# Patient Record
Sex: FEMALE | Race: OTHER | URBAN - METROPOLITAN AREA
[De-identification: names, ages, dates, MRNs, and addresses within clinical notes are randomized per-mention and may not be internally consistent; named-entity substitution may affect disease eponyms.]

---

## 2017-06-23 ENCOUNTER — EMERGENCY (EMERGENCY)
Facility: HOSPITAL | Age: 22
LOS: 0 days | Discharge: HOME | End: 2017-06-23
Attending: EMERGENCY MEDICINE | Admitting: EMERGENCY MEDICINE
Payer: MEDICAID

## 2017-06-23 VITALS
SYSTOLIC BLOOD PRESSURE: 119 MMHG | RESPIRATION RATE: 18 BRPM | TEMPERATURE: 98 F | DIASTOLIC BLOOD PRESSURE: 67 MMHG | OXYGEN SATURATION: 100 %

## 2017-06-23 VITALS
HEART RATE: 73 BPM | WEIGHT: 173.94 LBS | OXYGEN SATURATION: 98 % | DIASTOLIC BLOOD PRESSURE: 65 MMHG | SYSTOLIC BLOOD PRESSURE: 110 MMHG | RESPIRATION RATE: 16 BRPM | TEMPERATURE: 99 F

## 2017-06-23 DIAGNOSIS — R10.9 UNSPECIFIED ABDOMINAL PAIN: ICD-10-CM

## 2017-06-23 LAB
ALBUMIN SERPL ELPH-MCNC: 4.1 G/DL — SIGNIFICANT CHANGE UP (ref 3.3–5)
ALP SERPL-CCNC: 95 U/L — SIGNIFICANT CHANGE UP (ref 40–120)
ALT FLD-CCNC: 14 U/L — SIGNIFICANT CHANGE UP (ref 12–78)
ANION GAP SERPL CALC-SCNC: 5 MMOL/L — SIGNIFICANT CHANGE UP (ref 5–17)
APPEARANCE UR: CLEAR — SIGNIFICANT CHANGE UP
AST SERPL-CCNC: 15 U/L — SIGNIFICANT CHANGE UP (ref 15–37)
BACTERIA # UR AUTO: (no result)
BASOPHILS # BLD AUTO: 0 K/UL — SIGNIFICANT CHANGE UP (ref 0–0.2)
BASOPHILS NFR BLD AUTO: 0.7 % — SIGNIFICANT CHANGE UP (ref 0–2)
BILIRUB SERPL-MCNC: 0.3 MG/DL — SIGNIFICANT CHANGE UP (ref 0.2–1.2)
BILIRUB UR-MCNC: NEGATIVE — SIGNIFICANT CHANGE UP
BUN SERPL-MCNC: 10 MG/DL — SIGNIFICANT CHANGE UP (ref 7–23)
CALCIUM SERPL-MCNC: 8.9 MG/DL — SIGNIFICANT CHANGE UP (ref 8.5–10.1)
CHLORIDE SERPL-SCNC: 110 MMOL/L — HIGH (ref 96–108)
CO2 SERPL-SCNC: 24 MMOL/L — SIGNIFICANT CHANGE UP (ref 22–31)
COLOR SPEC: YELLOW — SIGNIFICANT CHANGE UP
CREAT SERPL-MCNC: 0.63 MG/DL — SIGNIFICANT CHANGE UP (ref 0.5–1.3)
DIFF PNL FLD: (no result)
EOSINOPHIL # BLD AUTO: 0.1 K/UL — SIGNIFICANT CHANGE UP (ref 0–0.5)
EOSINOPHIL NFR BLD AUTO: 1.2 % — SIGNIFICANT CHANGE UP (ref 0–6)
EPI CELLS # UR: SIGNIFICANT CHANGE UP
GLUCOSE SERPL-MCNC: 77 MG/DL — SIGNIFICANT CHANGE UP (ref 70–99)
GLUCOSE UR QL: NEGATIVE MG/DL — SIGNIFICANT CHANGE UP
HCT VFR BLD CALC: 39.6 % — SIGNIFICANT CHANGE UP (ref 34.5–45)
HGB BLD-MCNC: 13.2 G/DL — SIGNIFICANT CHANGE UP (ref 11.5–15.5)
KETONES UR-MCNC: NEGATIVE — SIGNIFICANT CHANGE UP
LEUKOCYTE ESTERASE UR-ACNC: NEGATIVE — SIGNIFICANT CHANGE UP
LYMPHOCYTES # BLD AUTO: 2 K/UL — SIGNIFICANT CHANGE UP (ref 1–3.3)
LYMPHOCYTES # BLD AUTO: 33 % — SIGNIFICANT CHANGE UP (ref 13–44)
MCHC RBC-ENTMCNC: 28.8 PG — SIGNIFICANT CHANGE UP (ref 27–34)
MCHC RBC-ENTMCNC: 33.2 GM/DL — SIGNIFICANT CHANGE UP (ref 32–36)
MCV RBC AUTO: 86.6 FL — SIGNIFICANT CHANGE UP (ref 80–100)
MONOCYTES # BLD AUTO: 0.7 K/UL — SIGNIFICANT CHANGE UP (ref 0–0.9)
MONOCYTES NFR BLD AUTO: 12.1 % — SIGNIFICANT CHANGE UP (ref 2–14)
NEUTROPHILS # BLD AUTO: 3.3 K/UL — SIGNIFICANT CHANGE UP (ref 1.8–7.4)
NEUTROPHILS NFR BLD AUTO: 53 % — SIGNIFICANT CHANGE UP (ref 43–77)
NITRITE UR-MCNC: NEGATIVE — SIGNIFICANT CHANGE UP
PH UR: 6 — SIGNIFICANT CHANGE UP (ref 5–8)
PLATELET # BLD AUTO: 238 K/UL — SIGNIFICANT CHANGE UP (ref 150–400)
POTASSIUM SERPL-MCNC: 4.1 MMOL/L — SIGNIFICANT CHANGE UP (ref 3.5–5.3)
POTASSIUM SERPL-SCNC: 4.1 MMOL/L — SIGNIFICANT CHANGE UP (ref 3.5–5.3)
PROT SERPL-MCNC: 7.6 GM/DL — SIGNIFICANT CHANGE UP (ref 6–8.3)
PROT UR-MCNC: NEGATIVE MG/DL — SIGNIFICANT CHANGE UP
RBC # BLD: 4.57 M/UL — SIGNIFICANT CHANGE UP (ref 3.8–5.2)
RBC # FLD: 12.2 % — SIGNIFICANT CHANGE UP (ref 10.3–14.5)
RBC CASTS # UR COMP ASSIST: (no result) /HPF (ref 0–4)
SODIUM SERPL-SCNC: 139 MMOL/L — SIGNIFICANT CHANGE UP (ref 135–145)
SP GR SPEC: 1.01 — SIGNIFICANT CHANGE UP (ref 1.01–1.02)
UROBILINOGEN FLD QL: NEGATIVE MG/DL — SIGNIFICANT CHANGE UP
WBC # BLD: 6.1 K/UL — SIGNIFICANT CHANGE UP (ref 3.8–10.5)
WBC # FLD AUTO: 6.1 K/UL — SIGNIFICANT CHANGE UP (ref 3.8–10.5)
WBC UR QL: SIGNIFICANT CHANGE UP

## 2017-06-23 PROCEDURE — 76830 TRANSVAGINAL US NON-OB: CPT | Mod: 26

## 2017-06-23 PROCEDURE — 99285 EMERGENCY DEPT VISIT HI MDM: CPT

## 2017-06-23 RX ORDER — SODIUM CHLORIDE 9 MG/ML
3 INJECTION INTRAMUSCULAR; INTRAVENOUS; SUBCUTANEOUS ONCE
Qty: 0 | Refills: 0 | Status: COMPLETED | OUTPATIENT
Start: 2017-06-23 | End: 2017-06-23

## 2017-06-23 RX ADMIN — SODIUM CHLORIDE 3 MILLILITER(S): 9 INJECTION INTRAMUSCULAR; INTRAVENOUS; SUBCUTANEOUS at 15:40

## 2017-06-23 NOTE — ED STATDOCS - OBJECTIVE STATEMENT
21 y/o female pt w/ hx of implantable birth control presents to the ED c/o abd pain. Pt reports abnormal vaginal bleeding for the last two and a half months. Pt was at [facility] where they referred her to get a Ultrasound. Pt has no other complaints and denies fever/chills and n/v/d.

## 2017-06-23 NOTE — ED ADULT TRIAGE NOTE - CHIEF COMPLAINT QUOTE
Pt presents to ED c/o "pain in my ovaries". [facility] her to go to the ED for a sono

## 2017-06-23 NOTE — ED ADULT NURSE NOTE - OBJECTIVE STATEMENT
pt here for lower abdominal pain and her period since may 6 small amounts of blood, pt has implated birth control device to her right bicep area. lower abdomen tender no nvd bloods sent getting us done now.

## 2017-06-23 NOTE — ED STATDOCS - PROGRESS NOTE DETAILS
22 yr. old female PMH: Implantable BC  presents to ED with HEENT PERRLA EOMS intact, Neck; non tender no lymphadenopathy, Chest: Lungs clear, abd: soft tender right and left lower quadrant. Pelvic Exam; Labia no lesions, mild CVA tenderness +  felicita adenexa pain. cervix closed with small amount blood at OS. MTangredi NP 22 yr. old female PMH: Implantable BC  presents to ED with abdominal pain onset May 4th after she had Implantable BC inserted. Reports abnormal vaginal  bleeding. Was seen at [facility] yesterday and referred to ED for further evaluation. Seen and examined by attending in Intake. Plan: IV, Labs, US and pelvic exam. Will F/U with results and re evaluate. Asuncion NP

## 2017-11-03 ENCOUNTER — RESULT REVIEW (OUTPATIENT)
Age: 22
End: 2017-11-03

## 2018-04-20 ENCOUNTER — APPOINTMENT (OUTPATIENT)
Dept: ULTRASOUND IMAGING | Facility: CLINIC | Age: 23
End: 2018-04-20
Payer: MEDICAID

## 2018-04-20 ENCOUNTER — OUTPATIENT (OUTPATIENT)
Dept: OUTPATIENT SERVICES | Facility: HOSPITAL | Age: 23
LOS: 1 days | End: 2018-04-20
Payer: MEDICAID

## 2018-04-20 DIAGNOSIS — Z00.8 ENCOUNTER FOR OTHER GENERAL EXAMINATION: ICD-10-CM

## 2018-04-20 PROCEDURE — 76830 TRANSVAGINAL US NON-OB: CPT | Mod: 26

## 2018-04-20 PROCEDURE — 76856 US EXAM PELVIC COMPLETE: CPT

## 2018-04-20 PROCEDURE — 76830 TRANSVAGINAL US NON-OB: CPT

## 2018-04-20 PROCEDURE — 76856 US EXAM PELVIC COMPLETE: CPT | Mod: 26

## 2018-11-05 ENCOUNTER — RESULT REVIEW (OUTPATIENT)
Age: 23
End: 2018-11-05

## 2019-03-06 ENCOUNTER — ASOB RESULT (OUTPATIENT)
Age: 24
End: 2019-03-06

## 2019-03-06 ENCOUNTER — APPOINTMENT (OUTPATIENT)
Dept: ANTEPARTUM | Facility: CLINIC | Age: 24
End: 2019-03-06
Payer: MEDICAID

## 2019-03-06 PROCEDURE — 76801 OB US < 14 WKS SINGLE FETUS: CPT

## 2019-03-06 PROCEDURE — 76813 OB US NUCHAL MEAS 1 GEST: CPT

## 2019-05-03 ENCOUNTER — ASOB RESULT (OUTPATIENT)
Age: 24
End: 2019-05-03

## 2019-05-03 ENCOUNTER — APPOINTMENT (OUTPATIENT)
Dept: ANTEPARTUM | Facility: CLINIC | Age: 24
End: 2019-05-03
Payer: MEDICAID

## 2019-05-03 PROCEDURE — 76811 OB US DETAILED SNGL FETUS: CPT

## 2019-05-10 ENCOUNTER — ASOB RESULT (OUTPATIENT)
Age: 24
End: 2019-05-10

## 2019-05-10 ENCOUNTER — APPOINTMENT (OUTPATIENT)
Dept: ANTEPARTUM | Facility: CLINIC | Age: 24
End: 2019-05-10
Payer: MEDICAID

## 2019-05-10 PROCEDURE — 76815 OB US LIMITED FETUS(S): CPT

## 2019-07-24 ENCOUNTER — APPOINTMENT (OUTPATIENT)
Dept: ANTEPARTUM | Facility: CLINIC | Age: 24
End: 2019-07-24
Payer: MEDICAID

## 2019-07-24 ENCOUNTER — ASOB RESULT (OUTPATIENT)
Age: 24
End: 2019-07-24

## 2019-07-24 PROCEDURE — 76816 OB US FOLLOW-UP PER FETUS: CPT

## 2019-08-06 ENCOUNTER — APPOINTMENT (OUTPATIENT)
Dept: ANTEPARTUM | Facility: CLINIC | Age: 24
End: 2019-08-06
Payer: MEDICAID

## 2019-08-06 ENCOUNTER — ASOB RESULT (OUTPATIENT)
Age: 24
End: 2019-08-06

## 2019-08-06 PROCEDURE — 76816 OB US FOLLOW-UP PER FETUS: CPT

## 2019-08-06 PROCEDURE — 76819 FETAL BIOPHYS PROFIL W/O NST: CPT

## 2019-09-12 ENCOUNTER — INPATIENT (INPATIENT)
Facility: HOSPITAL | Age: 24
LOS: 1 days | Discharge: HOME | DRG: 560 | End: 2019-09-14
Attending: OBSTETRICS & GYNECOLOGY | Admitting: OBSTETRICS & GYNECOLOGY
Payer: MEDICAID

## 2019-09-12 VITALS — WEIGHT: 141.1 LBS | HEIGHT: 61 IN

## 2019-09-12 DIAGNOSIS — Z3A.00 WEEKS OF GESTATION OF PREGNANCY NOT SPECIFIED: ICD-10-CM

## 2019-09-12 LAB
BASOPHILS # BLD AUTO: 0.02 K/UL — SIGNIFICANT CHANGE UP (ref 0–0.2)
BASOPHILS NFR BLD AUTO: 0.1 % — SIGNIFICANT CHANGE UP (ref 0–2)
EOSINOPHIL # BLD AUTO: 0.02 K/UL — SIGNIFICANT CHANGE UP (ref 0–0.5)
EOSINOPHIL NFR BLD AUTO: 0.1 % — SIGNIFICANT CHANGE UP (ref 0–6)
HCT VFR BLD CALC: 34.5 % — SIGNIFICANT CHANGE UP (ref 34.5–45)
HCT VFR BLD CALC: 35.2 % — SIGNIFICANT CHANGE UP (ref 34.5–45)
HGB BLD-MCNC: 12 G/DL — SIGNIFICANT CHANGE UP (ref 11.5–15.5)
HGB BLD-MCNC: 12.1 G/DL — SIGNIFICANT CHANGE UP (ref 11.5–15.5)
IMM GRANULOCYTES NFR BLD AUTO: 0.6 % — SIGNIFICANT CHANGE UP (ref 0–1.5)
LYMPHOCYTES # BLD AUTO: 15.2 % — SIGNIFICANT CHANGE UP (ref 13–44)
LYMPHOCYTES # BLD AUTO: 2.04 K/UL — SIGNIFICANT CHANGE UP (ref 1–3.3)
MCHC RBC-ENTMCNC: 31.3 PG — SIGNIFICANT CHANGE UP (ref 27–34)
MCHC RBC-ENTMCNC: 34.4 GM/DL — SIGNIFICANT CHANGE UP (ref 32–36)
MCV RBC AUTO: 91.2 FL — SIGNIFICANT CHANGE UP (ref 80–100)
MONOCYTES # BLD AUTO: 0.9 K/UL — SIGNIFICANT CHANGE UP (ref 0–0.9)
MONOCYTES NFR BLD AUTO: 6.7 % — SIGNIFICANT CHANGE UP (ref 2–14)
NEUTROPHILS # BLD AUTO: 10.37 K/UL — HIGH (ref 1.8–7.4)
NEUTROPHILS NFR BLD AUTO: 77.3 % — HIGH (ref 43–77)
PLATELET # BLD AUTO: 196 K/UL — SIGNIFICANT CHANGE UP (ref 150–400)
RBC # BLD: 3.86 M/UL — SIGNIFICANT CHANGE UP (ref 3.8–5.2)
RBC # FLD: 13.4 % — SIGNIFICANT CHANGE UP (ref 10.3–14.5)
T PALLIDUM AB TITR SER: NEGATIVE — SIGNIFICANT CHANGE UP
WBC # BLD: 13.43 K/UL — HIGH (ref 3.8–10.5)
WBC # FLD AUTO: 13.43 K/UL — HIGH (ref 3.8–10.5)

## 2019-09-12 PROCEDURE — 86780 TREPONEMA PALLIDUM: CPT

## 2019-09-12 PROCEDURE — 86901 BLOOD TYPING SEROLOGIC RH(D): CPT

## 2019-09-12 PROCEDURE — 85014 HEMATOCRIT: CPT

## 2019-09-12 PROCEDURE — 36415 COLL VENOUS BLD VENIPUNCTURE: CPT

## 2019-09-12 PROCEDURE — 85025 COMPLETE CBC W/AUTO DIFF WBC: CPT

## 2019-09-12 PROCEDURE — 59050 FETAL MONITOR W/REPORT: CPT

## 2019-09-12 PROCEDURE — 85018 HEMOGLOBIN: CPT

## 2019-09-12 PROCEDURE — 90686 IIV4 VACC NO PRSV 0.5 ML IM: CPT

## 2019-09-12 PROCEDURE — G0463: CPT

## 2019-09-12 PROCEDURE — 86850 RBC ANTIBODY SCREEN: CPT

## 2019-09-12 PROCEDURE — 86900 BLOOD TYPING SEROLOGIC ABO: CPT

## 2019-09-12 PROCEDURE — G0008: CPT

## 2019-09-12 RX ORDER — PRAMOXINE HYDROCHLORIDE 150 MG/15G
1 AEROSOL, FOAM RECTAL EVERY 4 HOURS
Refills: 0 | Status: DISCONTINUED | OUTPATIENT
Start: 2019-09-12 | End: 2019-09-14

## 2019-09-12 RX ORDER — OXYCODONE HYDROCHLORIDE 5 MG/1
5 TABLET ORAL ONCE
Refills: 0 | Status: DISCONTINUED | OUTPATIENT
Start: 2019-09-12 | End: 2019-09-14

## 2019-09-12 RX ORDER — HYDROCORTISONE 1 %
1 OINTMENT (GRAM) TOPICAL EVERY 6 HOURS
Refills: 0 | Status: DISCONTINUED | OUTPATIENT
Start: 2019-09-12 | End: 2019-09-14

## 2019-09-12 RX ORDER — OXYCODONE HYDROCHLORIDE 5 MG/1
5 TABLET ORAL
Refills: 0 | Status: DISCONTINUED | OUTPATIENT
Start: 2019-09-12 | End: 2019-09-14

## 2019-09-12 RX ORDER — OXYTOCIN 10 UNIT/ML
333.33 VIAL (ML) INJECTION
Qty: 20 | Refills: 0 | Status: DISCONTINUED | OUTPATIENT
Start: 2019-09-12 | End: 2019-09-14

## 2019-09-12 RX ORDER — DIBUCAINE 1 %
1 OINTMENT (GRAM) RECTAL EVERY 6 HOURS
Refills: 0 | Status: DISCONTINUED | OUTPATIENT
Start: 2019-09-12 | End: 2019-09-14

## 2019-09-12 RX ORDER — TETANUS TOXOID, REDUCED DIPHTHERIA TOXOID AND ACELLULAR PERTUSSIS VACCINE, ADSORBED 5; 2.5; 8; 8; 2.5 [IU]/.5ML; [IU]/.5ML; UG/.5ML; UG/.5ML; UG/.5ML
0.5 SUSPENSION INTRAMUSCULAR ONCE
Refills: 0 | Status: DISCONTINUED | OUTPATIENT
Start: 2019-09-12 | End: 2019-09-14

## 2019-09-12 RX ORDER — MAGNESIUM HYDROXIDE 400 MG/1
30 TABLET, CHEWABLE ORAL
Refills: 0 | Status: DISCONTINUED | OUTPATIENT
Start: 2019-09-12 | End: 2019-09-14

## 2019-09-12 RX ORDER — IBUPROFEN 200 MG
600 TABLET ORAL EVERY 6 HOURS
Refills: 0 | Status: COMPLETED | OUTPATIENT
Start: 2019-09-12 | End: 2020-08-10

## 2019-09-12 RX ORDER — SODIUM CHLORIDE 9 MG/ML
1000 INJECTION, SOLUTION INTRAVENOUS
Refills: 0 | Status: DISCONTINUED | OUTPATIENT
Start: 2019-09-12 | End: 2019-09-12

## 2019-09-12 RX ORDER — LANOLIN
1 OINTMENT (GRAM) TOPICAL EVERY 6 HOURS
Refills: 0 | Status: DISCONTINUED | OUTPATIENT
Start: 2019-09-12 | End: 2019-09-14

## 2019-09-12 RX ORDER — AER TRAVELER 0.5 G/1
1 SOLUTION RECTAL; TOPICAL EVERY 4 HOURS
Refills: 0 | Status: DISCONTINUED | OUTPATIENT
Start: 2019-09-12 | End: 2019-09-14

## 2019-09-12 RX ORDER — SODIUM CHLORIDE 9 MG/ML
3 INJECTION INTRAMUSCULAR; INTRAVENOUS; SUBCUTANEOUS EVERY 8 HOURS
Refills: 0 | Status: DISCONTINUED | OUTPATIENT
Start: 2019-09-12 | End: 2019-09-14

## 2019-09-12 RX ORDER — CITRIC ACID/SODIUM CITRATE 300-500 MG
30 SOLUTION, ORAL ORAL ONCE
Refills: 0 | Status: DISCONTINUED | OUTPATIENT
Start: 2019-09-12 | End: 2019-09-12

## 2019-09-12 RX ORDER — ACETAMINOPHEN 500 MG
975 TABLET ORAL
Refills: 0 | Status: DISCONTINUED | OUTPATIENT
Start: 2019-09-12 | End: 2019-09-14

## 2019-09-12 RX ORDER — GLYCERIN ADULT
1 SUPPOSITORY, RECTAL RECTAL AT BEDTIME
Refills: 0 | Status: DISCONTINUED | OUTPATIENT
Start: 2019-09-12 | End: 2019-09-14

## 2019-09-12 RX ORDER — IBUPROFEN 200 MG
600 TABLET ORAL EVERY 6 HOURS
Refills: 0 | Status: DISCONTINUED | OUTPATIENT
Start: 2019-09-12 | End: 2019-09-14

## 2019-09-12 RX ORDER — OXYTOCIN 10 UNIT/ML
333.33 VIAL (ML) INJECTION
Qty: 20 | Refills: 0 | Status: DISCONTINUED | OUTPATIENT
Start: 2019-09-12 | End: 2019-09-12

## 2019-09-12 RX ORDER — DOCUSATE SODIUM 100 MG
100 CAPSULE ORAL
Refills: 0 | Status: DISCONTINUED | OUTPATIENT
Start: 2019-09-12 | End: 2019-09-14

## 2019-09-12 RX ORDER — KETOROLAC TROMETHAMINE 30 MG/ML
30 SYRINGE (ML) INJECTION ONCE
Refills: 0 | Status: DISCONTINUED | OUTPATIENT
Start: 2019-09-12 | End: 2019-09-12

## 2019-09-12 RX ORDER — BENZOCAINE 10 %
1 GEL (GRAM) MUCOUS MEMBRANE EVERY 6 HOURS
Refills: 0 | Status: DISCONTINUED | OUTPATIENT
Start: 2019-09-12 | End: 2019-09-14

## 2019-09-12 RX ORDER — DIPHENHYDRAMINE HCL 50 MG
25 CAPSULE ORAL EVERY 6 HOURS
Refills: 0 | Status: DISCONTINUED | OUTPATIENT
Start: 2019-09-12 | End: 2019-09-14

## 2019-09-12 RX ORDER — SIMETHICONE 80 MG/1
80 TABLET, CHEWABLE ORAL EVERY 4 HOURS
Refills: 0 | Status: DISCONTINUED | OUTPATIENT
Start: 2019-09-12 | End: 2019-09-14

## 2019-09-12 RX ADMIN — Medication 1 TABLET(S): at 11:45

## 2019-09-12 RX ADMIN — Medication 600 MILLIGRAM(S): at 18:53

## 2019-09-12 RX ADMIN — Medication 1000 MILLIUNIT(S)/MIN: at 04:03

## 2019-09-12 RX ADMIN — Medication 975 MILLIGRAM(S): at 15:00

## 2019-09-12 RX ADMIN — Medication 975 MILLIGRAM(S): at 22:00

## 2019-09-12 RX ADMIN — Medication 600 MILLIGRAM(S): at 12:45

## 2019-09-12 RX ADMIN — Medication 600 MILLIGRAM(S): at 11:45

## 2019-09-12 RX ADMIN — Medication 975 MILLIGRAM(S): at 16:00

## 2019-09-12 RX ADMIN — Medication 30 MILLIGRAM(S): at 04:36

## 2019-09-12 RX ADMIN — Medication 975 MILLIGRAM(S): at 21:28

## 2019-09-12 RX ADMIN — Medication 600 MILLIGRAM(S): at 17:53

## 2019-09-13 ENCOUNTER — TRANSCRIPTION ENCOUNTER (OUTPATIENT)
Age: 24
End: 2019-09-13

## 2019-09-13 RX ORDER — INFLUENZA VIRUS VACCINE 15; 15; 15; 15 UG/.5ML; UG/.5ML; UG/.5ML; UG/.5ML
0.5 SUSPENSION INTRAMUSCULAR ONCE
Refills: 0 | Status: COMPLETED | OUTPATIENT
Start: 2019-09-13 | End: 2019-09-13

## 2019-09-13 RX ORDER — IBUPROFEN 200 MG
1 TABLET ORAL
Qty: 20 | Refills: 0
Start: 2019-09-13 | End: 2019-09-17

## 2019-09-13 RX ADMIN — Medication 975 MILLIGRAM(S): at 21:30

## 2019-09-13 RX ADMIN — Medication 600 MILLIGRAM(S): at 18:50

## 2019-09-13 RX ADMIN — Medication 975 MILLIGRAM(S): at 09:40

## 2019-09-13 RX ADMIN — Medication 600 MILLIGRAM(S): at 17:48

## 2019-09-13 RX ADMIN — Medication 600 MILLIGRAM(S): at 11:50

## 2019-09-13 RX ADMIN — Medication 600 MILLIGRAM(S): at 00:35

## 2019-09-13 RX ADMIN — Medication 975 MILLIGRAM(S): at 16:00

## 2019-09-13 RX ADMIN — Medication 975 MILLIGRAM(S): at 10:40

## 2019-09-13 RX ADMIN — Medication 1 TABLET(S): at 11:50

## 2019-09-13 RX ADMIN — Medication 975 MILLIGRAM(S): at 15:08

## 2019-09-13 RX ADMIN — Medication 600 MILLIGRAM(S): at 06:45

## 2019-09-13 RX ADMIN — Medication 600 MILLIGRAM(S): at 06:11

## 2019-09-13 RX ADMIN — Medication 975 MILLIGRAM(S): at 20:56

## 2019-09-13 RX ADMIN — Medication 600 MILLIGRAM(S): at 00:03

## 2019-09-13 RX ADMIN — INFLUENZA VIRUS VACCINE 0.5 MILLILITER(S): 15; 15; 15; 15 SUSPENSION INTRAMUSCULAR at 22:39

## 2019-09-13 RX ADMIN — Medication 600 MILLIGRAM(S): at 12:50

## 2019-09-13 NOTE — PROGRESS NOTE ADULT - ASSESSMENT
A/P:  Patient is a 25yo  now PPD#1 s/p spontaneous vaginal delivery  -Stable  - AM cbc pending  -Voiding, tolerating PO, bowel function nml   -Advance care as tolerated   -Continue routine postpartum/postoperative care and education.  -Pt encouraged to increase ambulation and PO intake  -D/C day 2 if meeting all expected milestones

## 2019-09-13 NOTE — DISCHARGE NOTE OB - MATERIALS PROVIDED
Immunization Record/Breastfeeding Mother’s Support Group Information/Guide to Postpartum Care/[facility] Hearing Screen Program/Vaccinations/[facility] [place] Screening Program/Bottle Feeding Log/Breastfeeding Log/Back To Sleep Handout/Shaken Baby Prevention Handout

## 2019-09-13 NOTE — DISCHARGE NOTE OB - PATIENT PORTAL LINK FT
You can access the FollowMyHealth Patient Portal offered by [facility] by registering at the following website: [email or web address]. By joining [employer]’s FollowMyHealth portal, you will also be able to view your health information using other applications (apps) compatible with our system.

## 2019-09-13 NOTE — PROGRESS NOTE ADULT - SUBJECTIVE AND OBJECTIVE BOX
Patient is a 23yo  now PPD#1 s/p spontaneous vaginal delivery    S:    No acute events overnight.  Pt seen and examined at bedside. Pt doing well.  Has no complaints.  Pain well controlled on current regiment.  Pt ambulating, tolerating PO, voiding w/o difficulty, +flatus/-BM.    O:    T(C): 36.7 (19 @ 05:15), Max: 37 (19 @ 20:00)  HR: 74 (19 @ 05:15) (72 - 95)  BP: 96/55 (19 @ 05:15) (96/55 - 110/67)  RR: 16 (19 @ 05:15) (16 - 16)  SpO2: 97% (19 @ 05:15) (97% - 100%)    Physical Exam  Breast: NT, non-engorged  Abdomen:  soft, NT, ND, BS+  Uterus:  firm below umbilicus  VE:  expectant lochia  Ext:  NT, nonedematous                          12.0   x     )-----------( x        ( 12 Sep 2019 08:07 )             34.5

## 2019-09-13 NOTE — PROGRESS NOTE ADULT - SUBJECTIVE AND OBJECTIVE BOX
Patient is a 30yo  now PPD#1 s/p spontaneous vaginal delivery    S:    No acute events overnight.  Pt seen and examined at bedside. Pt doing well.  Has no complaints.  Pain well controlled on current regiment.  Pt ambulating, tolerating PO, voiding w/o difficulty, -flatus/-BM.    O:    T(C): 36.7 (19 @ 05:15), Max: 37 (19 @ 20:00)  HR: 74 (19 @ 05:15) (72 - 95)  BP: 96/55 (19 @ 05:15) (96/55 - 110/67)  RR: 16 (19 @ 05:15) (16 - 16)  SpO2: 97% (19 @ 05:15) (97% - 100%)    Physical Exam  Breast: NT, non-engorged  Abdomen:  soft, NT, ND, BS+  Uterus:  firm below umbilicus  VE:  expectant lochia  Ext:  NT, nonedematous                          12.0   x     )-----------( x        ( 12 Sep 2019 08:07 )             34.5

## 2019-09-13 NOTE — DISCHARGE NOTE OB - MEDICATION SUMMARY - MEDICATIONS TO TAKE
I will START or STAY ON the medications listed below when I get home from the hospital:    ibuprofen 600 mg oral tablet  -- 1 tab(s) by mouth every 6 hours, As Needed   -- Indication: For pain control    Prenatal Multivitamins with Folic Acid 1 mg oral tablet  -- 1 tab(s) by mouth once a day  -- Indication: For maternal wellness

## 2019-09-13 NOTE — DISCHARGE NOTE OB - HOSPITAL COURSE
Patient had a vaginal delivery. Hospital course was uncomplicated. Her pain was well controlled. She is tolerating a regular diet. She is ambulating independently. She was voiding without assistance. Patient with flatus. Labs and Vitals WNL at time of discharge.

## 2019-09-13 NOTE — DISCHARGE NOTE OB - CARE PROVIDER_API CALL
Masha Cheney)  Obstetrics and Gynecology  [address] [place] [address]  Phone: [phone]  Fax: [phone]  Follow Up Time:

## 2019-09-13 NOTE — DISCHARGE NOTE OB - CARE PLAN
Principal Discharge DX:	 (normal spontaneous vaginal delivery)  Goal:	delivery and recovery  Assessment and plan of treatment:	1) Please take ibuprofen as needed for pain as prescribed.  2) Nothing in the vagina for 6 weeks (including no sex, no tampons, and no douching).  3) Please call your doctor for a follow up your postpartum appointment in 6 weeks.  4) Please continue taking vitamins postpartum. Take iron and colace for acute blood loss anemia.  5) Please call the office sooner if you have heavy vaginal bleeding, severe abdominal pain, or fever > 100.4F.

## 2019-09-13 NOTE — DISCHARGE NOTE OB - PLAN OF CARE
delivery and recovery 1) Please take ibuprofen as needed for pain as prescribed.  2) Nothing in the vagina for 6 weeks (including no sex, no tampons, and no douching).  3) Please call your doctor for a follow up your postpartum appointment in 6 weeks.  4) Please continue taking vitamins postpartum. Take iron and colace for acute blood loss anemia.  5) Please call the office sooner if you have heavy vaginal bleeding, severe abdominal pain, or fever > 100.4F.

## 2019-09-13 NOTE — PROGRESS NOTE ADULT - ASSESSMENT
A/P:  Patient is a 28yo  now PPD#1 s/p spontaneous vaginal delivery  -Stable  -Voiding, tolerating PO, bowel function nml   -Advance care as tolerated   -Continue routine postpartum/postoperative care and education.  -Pt encouraged to increase ambulation and PO intake  -D/C day 2 if meeting all expected milestones

## 2019-09-13 NOTE — DISCHARGE NOTE OB - ADDITIONAL INSTRUCTIONS
Please call the [facility] for find out when your next appointment is for a postpartum evaluation.

## 2019-09-14 VITALS
RESPIRATION RATE: 17 BRPM | SYSTOLIC BLOOD PRESSURE: 106 MMHG | TEMPERATURE: 98 F | DIASTOLIC BLOOD PRESSURE: 67 MMHG | HEART RATE: 76 BPM | OXYGEN SATURATION: 99 %

## 2019-09-14 RX ADMIN — Medication 600 MILLIGRAM(S): at 00:50

## 2019-09-14 RX ADMIN — Medication 600 MILLIGRAM(S): at 00:16

## 2019-09-14 RX ADMIN — Medication 600 MILLIGRAM(S): at 06:28

## 2019-09-14 NOTE — PROGRESS NOTE ADULT - SUBJECTIVE AND OBJECTIVE BOX
23 yo Female  PPD# 2 . Int used: . Patient is doing well and has no acute complaints. Pain is well controlled with medications. Patient is tolerating regular diet. She is OOB and urinating w/o any difficulties.  Denies bowel movement at this time. Patient is breast and bottle feeding.   RH Status: +   Rubella Status: Immune     CV: normal S1 and S2, rate and rhythm.   Lungs: clear b/l   Abdo: Fundus firm w/o tenderness   PV: No calf tenderness, edema.     Vital Signs Last 24 Hrs  T(C): 36.7 (14 Sep 2019 08:20), Max: 36.7 (13 Sep 2019 19:20)  T(F): 98 (14 Sep 2019 08:20), Max: 98 (13 Sep 2019 19:20)  HR: 76 (14 Sep 2019 08:20) (76 - 78)  BP: 106/67 (14 Sep 2019 08:20) (106/67 - 120/73)  BP(mean): --  RR: 17 (14 Sep 2019 08:20) (16 - 17)  SpO2: 99% (14 Sep 2019 08:20) (98% - 99%)    A/P: .. yo female PPD#2    -Pain management   -Regular diet   -Cont. breast/bottle feeding   -f/u CBC

## 2019-09-17 DIAGNOSIS — Z3A.40 40 WEEKS GESTATION OF PREGNANCY: ICD-10-CM

## 2020-06-05 NOTE — PATIENT PROFILE OB - PATIENT REPRESENTATIVE NAME
Vernon Newton discharged to home. Patient provided with educational materials upon discharge. Valuables and belongings sent with patient.   discharge summary, discharge instructions, medications and follow up appointments reviewed with patient.  Patient verbalized understanding.   
Georgie Wright

## 2021-03-16 ENCOUNTER — OUTPATIENT (OUTPATIENT)
Dept: OUTPATIENT SERVICES | Facility: HOSPITAL | Age: 26
LOS: 1 days | End: 2021-03-16
Payer: MEDICAID

## 2021-03-16 DIAGNOSIS — Z20.828 CONTACT WITH AND (SUSPECTED) EXPOSURE TO OTHER VIRAL COMMUNICABLE DISEASES: ICD-10-CM

## 2021-03-16 LAB — SARS-COV-2 RNA SPEC QL NAA+PROBE: SIGNIFICANT CHANGE UP

## 2021-03-16 PROCEDURE — U0003: CPT

## 2021-03-16 PROCEDURE — C9803: CPT

## 2021-03-16 PROCEDURE — U0005: CPT

## 2021-03-17 DIAGNOSIS — Z20.828 CONTACT WITH AND (SUSPECTED) EXPOSURE TO OTHER VIRAL COMMUNICABLE DISEASES: ICD-10-CM

## 2022-01-14 ENCOUNTER — OUTPATIENT (OUTPATIENT)
Dept: OUTPATIENT SERVICES | Facility: HOSPITAL | Age: 27
LOS: 1 days | End: 2022-01-14
Payer: MEDICAID

## 2022-01-14 DIAGNOSIS — Z20.828 CONTACT WITH AND (SUSPECTED) EXPOSURE TO OTHER VIRAL COMMUNICABLE DISEASES: ICD-10-CM

## 2022-01-14 LAB — SARS-COV-2 RNA SPEC QL NAA+PROBE: SIGNIFICANT CHANGE UP

## 2022-01-14 PROCEDURE — U0003: CPT

## 2022-01-14 PROCEDURE — U0005: CPT

## 2022-01-14 PROCEDURE — C9803: CPT

## 2022-01-15 DIAGNOSIS — Z20.828 CONTACT WITH AND (SUSPECTED) EXPOSURE TO OTHER VIRAL COMMUNICABLE DISEASES: ICD-10-CM

## 2023-03-13 ENCOUNTER — ASOB RESULT (OUTPATIENT)
Age: 28
End: 2023-03-13

## 2023-03-13 ENCOUNTER — APPOINTMENT (OUTPATIENT)
Dept: ANTEPARTUM | Facility: CLINIC | Age: 28
End: 2023-03-13
Payer: MEDICAID

## 2023-03-13 DIAGNOSIS — O35.1XX0 MATERNAL CARE FOR (SUSPECTED) CHROMOSOMAL ABNORMALITY IN FETUS, NOT APPLICABLE OR UNSPECIFIED: ICD-10-CM

## 2023-03-13 PROCEDURE — 36415 COLL VENOUS BLD VENIPUNCTURE: CPT

## 2023-03-13 PROCEDURE — 76813 OB US NUCHAL MEAS 1 GEST: CPT

## 2023-03-20 LAB
ADDITIONAL US: NORMAL
COMMENTS: AFP: NORMAL
CRL SCAN TWIN B: NORMAL
CRL SCAN: NORMAL
CROWN RUMP LENGTH TWIN B: NORMAL
CROWN RUMP LENGTH: 78.5 MM
DOWN SYNDROME AGE RISK: NORMAL
DOWN SYNDROME INTERPRETATION: NORMAL
DOWN SYNDROME SCREENING RISK: NORMAL
GEST. AGE ON COLLECTION DATE: 13.6 WEEKS
HCG MOM: 1.05
HCG VALUE: 81.9 IU/ML
MATERNAL AGE AT EDD: 28.7 YR
NOTE: AFP: NORMAL
NT MOM TWIN B: NORMAL
NT TWIN B: NORMAL
NUCHAL TRANSLUCENCY (NT): 1.2 MM
NUCHAL TRANSLUCENCY MOM: 0.62
NUMBER OF FETUSES: 1
PAPP-A MOM: 0.81
PAPP-A VALUE: 1091.2 NG/ML
RACE: NORMAL
RESULTS AFP: NORMAL
SONOGRAPHER ID#: NORMAL
SUBMIT PART 2 SAMPLE USING: NORMAL
TEST RESULTS: AFP: NORMAL
TRISOMY 18 AGE RISK: NORMAL
TRISOMY 18 INTERPRETATION: NORMAL
TRISOMY 18 SCREENING RISK: NORMAL
WEIGHT AFP: 162 LBS

## 2023-04-24 ENCOUNTER — APPOINTMENT (OUTPATIENT)
Dept: ANTEPARTUM | Facility: CLINIC | Age: 28
End: 2023-04-24
Payer: MEDICAID

## 2023-04-24 ENCOUNTER — ASOB RESULT (OUTPATIENT)
Age: 28
End: 2023-04-24

## 2023-04-24 DIAGNOSIS — Z34.92 ENCOUNTER FOR SUPERVISION OF NORMAL PREGNANCY, UNSPECIFIED, SECOND TRIMESTER: ICD-10-CM

## 2023-04-24 DIAGNOSIS — O35.9XX0 MATERNAL CARE FOR (SUSPECTED) FETAL ABNORMALITY AND DAMAGE, UNSPECIFIED, NOT APPLICABLE OR UNSPECIFIED: ICD-10-CM

## 2023-04-24 PROCEDURE — 76817 TRANSVAGINAL US OBSTETRIC: CPT

## 2023-04-24 PROCEDURE — 76811 OB US DETAILED SNGL FETUS: CPT

## 2023-04-24 PROCEDURE — 36415 COLL VENOUS BLD VENIPUNCTURE: CPT

## 2023-05-08 LAB
ADDITIONAL US: NORMAL
AFP MOM: 1.34
AFP VALUE: 66 NG/ML
COLLECTED ON 2: NORMAL
COLLECTED ON: NORMAL
CRL SCAN TWIN B: NORMAL
CRL SCAN: NORMAL
CROWN RUMP LENGTH TWIN B: NORMAL
CROWN RUMP LENGTH: 78.5 MM
DIA MOM: 0.94
DIA VALUE: 152.7 PG/ML
DOWN SYNDROME AGE RISK: NORMAL
DOWN SYNDROME INTERPRETATION: NORMAL
DOWN SYNDROME SCREENING RISK: NORMAL
FIRST TRIMESTER SAMPLE: NORMAL
GEST. AGE ON COLLECTION DATE: 13.6 WEEKS
GESTATIONAL AGE: 19.6 WEEKS
HCG MOM: 1.89
HCG VALUE: 41.9 IU/ML
INSULIN DEP DIABETES: NO
MATERNAL AGE AT EDD: 28.7 YR
NT MOM TWIN B: NORMAL
NT TWIN B: NORMAL
NUCHAL TRANSLUCENCY (NT): 1.2 MM
NUCHAL TRANSLUCENCY MOM: 0.62
NUMBER OF FETUSES: 1
OPEN SPINA BIFIDA: NORMAL
OSB INTERPRETATION: NORMAL
PAPP-A MOM: 0.81
PAPP-A VALUE: 1091.2 NG/ML
RACE: NORMAL
SECOND TRIMESTER SAMPLE: NORMAL
SEQUENTIAL 2 COMMENTS: NORMAL
SEQUENTIAL 2 NOTE: NORMAL
SEQUENTIAL 2 RESULTS: NORMAL
SEQUENTIAL 2 TEST RESULTS: NORMAL
SONOGRAPHER ID#: NORMAL
TRISOMY 18 AGE RISK: NORMAL
TRISOMY 18 INTERPRETATION: NORMAL
TRISOMY 18 SCREENING RISK: NORMAL
UE3 MOM: 1.6
UE3 VALUE: 3.1 NG/ML
WEIGHT AFP: 162 LBS
WEIGHT: 164 LBS

## 2023-05-16 ENCOUNTER — EMERGENCY (EMERGENCY)
Facility: HOSPITAL | Age: 28
LOS: 0 days | Discharge: HOME | End: 2023-05-17
Attending: HOSPITALIST
Payer: MEDICAID

## 2023-05-16 VITALS
SYSTOLIC BLOOD PRESSURE: 106 MMHG | TEMPERATURE: 99 F | DIASTOLIC BLOOD PRESSURE: 68 MMHG | RESPIRATION RATE: 18 BRPM | HEART RATE: 98 BPM | OXYGEN SATURATION: 99 %

## 2023-05-16 DIAGNOSIS — O99.891 OTHER SPECIFIED DISEASES AND CONDITIONS COMPLICATING PREGNANCY: ICD-10-CM

## 2023-05-16 DIAGNOSIS — K52.9 NONINFECTIVE GASTROENTERITIS AND COLITIS, UNSPECIFIED: ICD-10-CM

## 2023-05-16 DIAGNOSIS — O99.612 DISEASES OF THE DIGESTIVE SYSTEM COMPLICATING PREGNANCY, SECOND TRIMESTER: ICD-10-CM

## 2023-05-16 DIAGNOSIS — R51.9 HEADACHE, UNSPECIFIED: ICD-10-CM

## 2023-05-16 DIAGNOSIS — Z3A.22 22 WEEKS GESTATION OF PREGNANCY: ICD-10-CM

## 2023-05-16 DIAGNOSIS — R50.9 FEVER, UNSPECIFIED: ICD-10-CM

## 2023-05-16 LAB
ALBUMIN SERPL ELPH-MCNC: 3.2 G/DL — LOW (ref 3.3–5)
ALP SERPL-CCNC: 84 U/L — SIGNIFICANT CHANGE UP (ref 40–120)
ALT FLD-CCNC: 16 U/L — SIGNIFICANT CHANGE UP (ref 12–78)
ANION GAP SERPL CALC-SCNC: 9 MMOL/L — SIGNIFICANT CHANGE UP (ref 5–17)
AST SERPL-CCNC: 16 U/L — SIGNIFICANT CHANGE UP (ref 15–37)
BASOPHILS # BLD AUTO: 0.01 K/UL — SIGNIFICANT CHANGE UP (ref 0–0.2)
BASOPHILS NFR BLD AUTO: 0.1 % — SIGNIFICANT CHANGE UP (ref 0–2)
BILIRUB SERPL-MCNC: 0.5 MG/DL — SIGNIFICANT CHANGE UP (ref 0.2–1.2)
BLD GP AB SCN SERPL QL: SIGNIFICANT CHANGE UP
BUN SERPL-MCNC: 7 MG/DL — SIGNIFICANT CHANGE UP (ref 7–23)
CALCIUM SERPL-MCNC: 8.9 MG/DL — SIGNIFICANT CHANGE UP (ref 8.5–10.1)
CHLORIDE SERPL-SCNC: 106 MMOL/L — SIGNIFICANT CHANGE UP (ref 96–108)
CO2 SERPL-SCNC: 22 MMOL/L — SIGNIFICANT CHANGE UP (ref 22–31)
CREAT SERPL-MCNC: 0.42 MG/DL — LOW (ref 0.5–1.3)
EGFR: 137 ML/MIN/1.73M2 — SIGNIFICANT CHANGE UP
EOSINOPHIL # BLD AUTO: 0.01 K/UL — SIGNIFICANT CHANGE UP (ref 0–0.5)
EOSINOPHIL NFR BLD AUTO: 0.1 % — SIGNIFICANT CHANGE UP (ref 0–6)
GLUCOSE SERPL-MCNC: 73 MG/DL — SIGNIFICANT CHANGE UP (ref 70–99)
HCG SERPL-ACNC: HIGH MIU/ML
HCT VFR BLD CALC: 32.7 % — LOW (ref 34.5–45)
HGB BLD-MCNC: 11.3 G/DL — LOW (ref 11.5–15.5)
IMM GRANULOCYTES NFR BLD AUTO: 0.5 % — SIGNIFICANT CHANGE UP (ref 0–0.9)
LYMPHOCYTES # BLD AUTO: 0.78 K/UL — LOW (ref 1–3.3)
LYMPHOCYTES # BLD AUTO: 10 % — LOW (ref 13–44)
MCHC RBC-ENTMCNC: 31.5 PG — SIGNIFICANT CHANGE UP (ref 27–34)
MCHC RBC-ENTMCNC: 34.6 GM/DL — SIGNIFICANT CHANGE UP (ref 32–36)
MCV RBC AUTO: 91.1 FL — SIGNIFICANT CHANGE UP (ref 80–100)
MONOCYTES # BLD AUTO: 0.64 K/UL — SIGNIFICANT CHANGE UP (ref 0–0.9)
MONOCYTES NFR BLD AUTO: 8.2 % — SIGNIFICANT CHANGE UP (ref 2–14)
NEUTROPHILS # BLD AUTO: 6.33 K/UL — SIGNIFICANT CHANGE UP (ref 1.8–7.4)
NEUTROPHILS NFR BLD AUTO: 81.1 % — HIGH (ref 43–77)
PLATELET # BLD AUTO: 221 K/UL — SIGNIFICANT CHANGE UP (ref 150–400)
POTASSIUM SERPL-MCNC: 3.8 MMOL/L — SIGNIFICANT CHANGE UP (ref 3.5–5.3)
POTASSIUM SERPL-SCNC: 3.8 MMOL/L — SIGNIFICANT CHANGE UP (ref 3.5–5.3)
PROT SERPL-MCNC: 7.2 GM/DL — SIGNIFICANT CHANGE UP (ref 6–8.3)
RBC # BLD: 3.59 M/UL — LOW (ref 3.8–5.2)
RBC # FLD: 13.3 % — SIGNIFICANT CHANGE UP (ref 10.3–14.5)
SODIUM SERPL-SCNC: 137 MMOL/L — SIGNIFICANT CHANGE UP (ref 135–145)
WBC # BLD: 7.81 K/UL — SIGNIFICANT CHANGE UP (ref 3.8–10.5)
WBC # FLD AUTO: 7.81 K/UL — SIGNIFICANT CHANGE UP (ref 3.8–10.5)

## 2023-05-16 PROCEDURE — 96360 HYDRATION IV INFUSION INIT: CPT

## 2023-05-16 PROCEDURE — 93975 VASCULAR STUDY: CPT | Mod: 26

## 2023-05-16 PROCEDURE — 99285 EMERGENCY DEPT VISIT HI MDM: CPT | Mod: 25

## 2023-05-16 PROCEDURE — 93975 VASCULAR STUDY: CPT

## 2023-05-16 PROCEDURE — 84702 CHORIONIC GONADOTROPIN TEST: CPT

## 2023-05-16 PROCEDURE — 76815 OB US LIMITED FETUS(S): CPT | Mod: 26

## 2023-05-16 PROCEDURE — 36415 COLL VENOUS BLD VENIPUNCTURE: CPT

## 2023-05-16 PROCEDURE — 85025 COMPLETE CBC W/AUTO DIFF WBC: CPT

## 2023-05-16 PROCEDURE — 86850 RBC ANTIBODY SCREEN: CPT

## 2023-05-16 PROCEDURE — 80053 COMPREHEN METABOLIC PANEL: CPT

## 2023-05-16 PROCEDURE — 86901 BLOOD TYPING SEROLOGIC RH(D): CPT

## 2023-05-16 PROCEDURE — 99285 EMERGENCY DEPT VISIT HI MDM: CPT

## 2023-05-16 PROCEDURE — 76815 OB US LIMITED FETUS(S): CPT

## 2023-05-16 PROCEDURE — 86900 BLOOD TYPING SEROLOGIC ABO: CPT

## 2023-05-16 RX ORDER — SODIUM CHLORIDE 9 MG/ML
1000 INJECTION INTRAMUSCULAR; INTRAVENOUS; SUBCUTANEOUS ONCE
Refills: 0 | Status: COMPLETED | OUTPATIENT
Start: 2023-05-16 | End: 2023-05-16

## 2023-05-16 RX ORDER — ONDANSETRON 8 MG/1
4 TABLET, FILM COATED ORAL ONCE
Refills: 0 | Status: COMPLETED | OUTPATIENT
Start: 2023-05-16 | End: 2023-05-16

## 2023-05-16 RX ORDER — HYDROCORTISONE 1 %
1 OINTMENT (GRAM) TOPICAL ONCE
Refills: 0 | Status: COMPLETED | OUTPATIENT
Start: 2023-05-16 | End: 2023-05-17

## 2023-05-16 RX ADMIN — SODIUM CHLORIDE 1000 MILLILITER(S): 9 INJECTION INTRAMUSCULAR; INTRAVENOUS; SUBCUTANEOUS at 22:39

## 2023-05-16 RX ADMIN — ONDANSETRON 4 MILLIGRAM(S): 8 TABLET, FILM COATED ORAL at 22:38

## 2023-05-16 RX ADMIN — SODIUM CHLORIDE 1000 MILLILITER(S): 9 INJECTION INTRAMUSCULAR; INTRAVENOUS; SUBCUTANEOUS at 23:20

## 2023-05-16 NOTE — ED STATDOCS - PROGRESS NOTE DETAILS
History with assistance from  ID# [id number]. 29 y/o F, est 22w GA,  presents with nausea, vomiting, abdominal cramping today. Children currently have vomiting x2 days as well. States she can feel baby move. Reports lower abdominal cramping, worse when standing. Denies vaginal bleeding/discharge. OB: Dr. Cheney. PE: Well appearing. Cardiac: s1s2, RRR. Lungs: CTAB. Abdomen: Gravid consistent with GA, nontender. A/P: likely AGE, will check labs, TVUS, zofran, IVF, consult OBGYN. - Erik Yi PA-C

## 2023-05-16 NOTE — ED ADULT TRIAGE NOTE - CHIEF COMPLAINT QUOTE
Patient presented to the ED c/o n/v/d. Patient reports symptoms began yesterday morning. Patient reports herself and her two children have the same symptoms. All three unable to tolerate PO intake at this time. Patient denies pmhx for herself and her children at this time. Patient reports she is 22 weeks pregnant. L&D made aware of patients arrival to ED. Patient does not appear to be in any distress at this time.

## 2023-05-16 NOTE — ED STATDOCS - OBJECTIVE STATEMENT
: [id number]. 27 yo F,  at 22 weeks, with no PMHx presents to ED c/o HA, cough, n/v/d since yesterday. Children in household with similar sx. States that she also feels some pelvic pain and cramping when standing up, does feel baby moving. Denies any vaginal bleeding or discharge. No fevers. Follows Dr. Kinsey GYN.

## 2023-05-16 NOTE — ED STATDOCS - NSFOLLOWUPINSTRUCTIONS_ED_ALL_ED_FT
Please take Zofran as needed for nausea and vomiting as prescribed.  Please return for any new or worsening symptoms.

## 2023-05-16 NOTE — ED STATDOCS - CLINICAL SUMMARY MEDICAL DECISION MAKING FREE TEXT BOX
27 yo F with likely viral gastroenteritis,  at 22 weeks, will give fluids, zofran, check labs, urine, and consult GYN for evaluation of pregnancy. 27 yo F with likely viral gastroenteritis,  at 22 weeks, will give fluids, zofran, check labs, urine, and consult GYN for evaluation of pregnancy. Feeling better after fluids and Zofran.  Tolerating p.o. intake.  Evaluated by OB/GYN and cleared for DC home.

## 2023-05-16 NOTE — ED STATDOCS - PATIENT PORTAL LINK FT
You can access the FollowMyHealth Patient Portal offered by [facility] by registering at the following website: [email or web address]. By joining [employer]’s FollowMyHealth portal, you will also be able to view your health information using other applications (apps) compatible with our system.

## 2023-05-16 NOTE — ED STATDOCS - NS_ ATTENDINGSCRIBEDETAILS _ED_A_ED_FT
Dory Short MD: The history, relevant review of systems, past medical and surgical history, medical decision making, and physical examination was documented by the scribe in my presence and I attest to the accuracy of the documentation.

## 2023-05-17 VITALS
TEMPERATURE: 98 F | OXYGEN SATURATION: 99 % | HEART RATE: 91 BPM | RESPIRATION RATE: 16 BRPM | DIASTOLIC BLOOD PRESSURE: 51 MMHG | SYSTOLIC BLOOD PRESSURE: 112 MMHG

## 2023-05-17 PROCEDURE — 99283 EMERGENCY DEPT VISIT LOW MDM: CPT

## 2023-05-17 RX ORDER — ONDANSETRON 8 MG/1
1 TABLET, FILM COATED ORAL
Qty: 15 | Refills: 0
Start: 2023-05-17 | End: 2023-05-21

## 2023-05-17 RX ADMIN — Medication 1 APPLICATION(S): at 00:12

## 2023-05-17 NOTE — ED ADULT NURSE NOTE - NSFALLUNIVINTERV_ED_ALL_ED
Bed/Stretcher in lowest position, wheels locked, appropriate side rails in place/Call bell, personal items and telephone in reach/Instruct patient to call for assistance before getting out of bed/chair/stretcher/Non-slip footwear applied when patient is off stretcher/[place] to call system/Physically safe environment - no spills, clutter or unnecessary equipment/Purposeful proactive rounding/Room/bathroom lighting operational, light cord in reach

## 2023-05-17 NOTE — ED ADULT NURSE NOTE - NSSUHOSCREENINGYN_ED_ALL_ED
Hx of CHF, severe global LV dysfunction. On torsemide 20mg TID and carvedilol 6.125mg qd.   - C/w home meds
Yes - the patient is able to be screened

## 2023-05-17 NOTE — CONSULT NOTE ADULT - ASSESSMENT
28y  @ 22w4d GA by LMP (FRANCISCO 9/15/23) presents to the ED for nausea/ vomiting of 1 day at home with pelvic pressure.     A/P:  - VSS, afebrile  - H.3, WBC: 7.8  - Patient noted symptomatic improvement after Zofran  - Benign speculum exam with closed cervical os  - OB sono - Single live intrauterine fetus in breech presentation. Anterior placenta. No previa.  - No OB concern at this time.  - Will send Zofran ODT Rx to patient's pharmacy for suspect GI viral etiology. Patient satisfied with this plan.   - Patient to f/u with outpatient provider Dr. Cheney on Monday.  - Rest of care per ED.    D/w Dr. Cleveland

## 2023-05-17 NOTE — CONSULT NOTE ADULT - SUBJECTIVE AND OBJECTIVE BOX
HPI:     28y G_P_  Last Menstrual Period   presents with     PMHX;  PSHX;  POBHX;  PGYNHX:  SOCIAL:  Allergies    No Known Allergies    Intolerances      MEDS:      Vital Signs Last 24 Hrs  T(C): 36.7 (17 May 2023 00:12), Max: 37.2 (16 May 2023 20:22)  T(F): 98 (17 May 2023 00:12), Max: 98.9 (16 May 2023 20:22)  HR: 91 (17 May 2023 00:12) (91 - 98)  BP: 100/57 (17 May 2023 00:12) (100/57 - 106/68)  BP(mean): 80 (16 May 2023 20:22) (80 - 80)  RR: 18 (17 May 2023 00:12) (18 - 18)  SpO2: 98% (17 May 2023 00:12) (98% - 99%)    Parameters below as of 17 May 2023 00:12  Patient On (Oxygen Delivery Method): room air         PHYSICAL EXAM:      LABS:                        11.3   7.81  )-----------( 221      ( 16 May 2023 21:58 )             32.7     05-16    137  |  106  |  7   ----------------------------<  73  3.8   |  22  |  0.42<L>    Ca    8.9      16 May 2023 21:58    TPro  7.2  /  Alb  3.2<L>  /  TBili  0.5  /  DBili  x   /  AST  16  /  ALT  16  /  AlkPhos  84  05-16          RADIOLOGY STUDIES:    ID # [id number]    HPI:     28y  @ 22w4d GA by LMP (FRANCISCO 9/15/23) presents to the ED for nausea/ vomiting of 1 day at home. She also endorses bilateral pelvic pressure for the same time. She denies ctx, LOF of VB. She feels good fetal movement. She felt subjective fevers at home however not in hospital. She was given Zofran in ED and feels relief in symptoms. She endorses sick contacts at home. She denies CP, SOB, dysuria, or abnormal vaginal discharge.     PNC: at [facility]; prenatal course uncomplicated as per patient.     PMHX; denies  PSHX; denies   POBHX;  x2   PGYNHX: denies  SOCIAL: Denies EtOH, tobacco, illicit drug use during this pregnancy; feels safe at home.  Allergies: No Known Allergies  MEDS: PNV      Vital Signs Last 24 Hrs  T(C): 36.7 (17 May 2023 00:12), Max: 37.2 (16 May 2023 20:22)  T(F): 98 (17 May 2023 00:12), Max: 98.9 (16 May 2023 20:22)  HR: 91 (17 May 2023 00:12) (91 - 98)  BP: 100/57 (17 May 2023 00:12) (100/57 - 106/68)  BP(mean): 80 (16 May 2023 20:22) (80 - 80)  RR: 18 (17 May 2023 00:12) (18 - 18)  SpO2: 98% (17 May 2023 00:12) (98% - 99%)    Parameters below as of 17 May 2023 00:12  Patient On (Oxygen Delivery Method): room air         PHYSICAL EXAM:  Gen: NAD  Abd: Soft, nontender, gravid uterus  Pelvic - SSE: Cervical os closed, physiologic discharge, no abnormal bleeding   Ext: nontender to palpation LE bilaterally     LABS:                        11.3   7.81  )-----------( 221      ( 16 May 2023 21:58 )             32.7     05-    137  |  106  |  7   ----------------------------<  73  3.8   |  22  |  0.42<L>    Ca    8.9      16 May 2023 21:58    TPro  7.2  /  Alb  3.2<L>  /  TBili  0.5  /  DBili  x   /  AST  16  /  ALT  16  /  AlkPhos  84            RADIOLOGY STUDIES:  < from: US Doppler Pelvis (23 @ 21:37) >  ACC: [id number] EXAM:  US DPLX PELVIC   ORDERED BY: TON ZIMMERMAN     ACC: [id number] EXAM:  US OB LTD FETUS(ES)   ORDERED BY: TON ZIMMERMAN     PROCEDURE DATE:  2023          INTERPRETATION:  CLINICAL INFORMATION: Pelvic pain in the second   trimester.    Clinical age: 22 weeks 4 days  Clinical EDC: 9/15/2023    COMPARISON: None during this gestation.    TECHNIQUE: Limited transabdominal fetal ultrasound was performed to   evaluate fetal well-being. Duplex Doppler ultrasound was performed.    FINDINGS: There is a single live intrauterine fetus in breech   presentation.    Homogeneous anterior placenta is noted. No previa.    Fetal Heart Rate: 159 bpm.    The study was not performed for survey the fetal anatomy.    Composite biometry estimates a gestational age of 22 weeks 6 days.    Right ovary: 2.1 x 1.8 x 2.0 cm. Within normal limits. Flow is   demonstrated to the ovary.  Left ovary: 1.5 x 1.0 x 1.4 cm. Within normal limits. Flow is   demonstrated to the ovary.      IMPRESSION:  Single live intrauterine fetus in breech presentation. Size compatible   with provided clinical dates.    Anterior placenta. No previa.    Please note the study was not performed for survey of the fetal anatomy.    --- End of Report ---            ZAHRA GOLDEN; Attending Radiologist  This document has been electronically signed. May 16 2023 10:23PM    < end of copied text >

## 2023-07-10 ENCOUNTER — APPOINTMENT (OUTPATIENT)
Dept: ANTEPARTUM | Facility: CLINIC | Age: 28
End: 2023-07-10
Payer: MEDICAID

## 2023-07-10 ENCOUNTER — ASOB RESULT (OUTPATIENT)
Age: 28
End: 2023-07-10

## 2023-07-10 PROBLEM — Z78.9 OTHER SPECIFIED HEALTH STATUS: Chronic | Status: ACTIVE | Noted: 2023-05-17

## 2023-07-10 PROCEDURE — 76816 OB US FOLLOW-UP PER FETUS: CPT

## 2023-07-10 PROCEDURE — 76819 FETAL BIOPHYS PROFIL W/O NST: CPT

## 2023-08-18 ENCOUNTER — APPOINTMENT (OUTPATIENT)
Dept: ANTEPARTUM | Facility: CLINIC | Age: 28
End: 2023-08-18
Payer: MEDICAID

## 2023-08-18 ENCOUNTER — ASOB RESULT (OUTPATIENT)
Age: 28
End: 2023-08-18

## 2023-08-18 PROCEDURE — 76816 OB US FOLLOW-UP PER FETUS: CPT

## 2023-08-18 PROCEDURE — 76819 FETAL BIOPHYS PROFIL W/O NST: CPT

## 2023-09-02 ENCOUNTER — INPATIENT (INPATIENT)
Facility: HOSPITAL | Age: 28
LOS: 1 days | Discharge: HOME | DRG: 560 | End: 2023-09-04
Attending: OBSTETRICS & GYNECOLOGY | Admitting: OBSTETRICS & GYNECOLOGY
Payer: MEDICAID

## 2023-09-02 VITALS — WEIGHT: 169.98 LBS

## 2023-09-02 DIAGNOSIS — O26.899 OTHER SPECIFIED PREGNANCY RELATED CONDITIONS, UNSPECIFIED TRIMESTER: ICD-10-CM

## 2023-09-02 DIAGNOSIS — Z3A.00 WEEKS OF GESTATION OF PREGNANCY NOT SPECIFIED: ICD-10-CM

## 2023-09-02 LAB
BASOPHILS # BLD AUTO: 0.02 K/UL — SIGNIFICANT CHANGE UP (ref 0–0.2)
BASOPHILS NFR BLD AUTO: 0.2 % — SIGNIFICANT CHANGE UP (ref 0–2)
EOSINOPHIL # BLD AUTO: 0.04 K/UL — SIGNIFICANT CHANGE UP (ref 0–0.5)
EOSINOPHIL NFR BLD AUTO: 0.4 % — SIGNIFICANT CHANGE UP (ref 0–6)
HCT VFR BLD CALC: 33.7 % — LOW (ref 34.5–45)
HGB BLD-MCNC: 11.6 G/DL — SIGNIFICANT CHANGE UP (ref 11.5–15.5)
IMM GRANULOCYTES NFR BLD AUTO: 0.6 % — SIGNIFICANT CHANGE UP (ref 0–0.9)
LYMPHOCYTES # BLD AUTO: 2.27 K/UL — SIGNIFICANT CHANGE UP (ref 1–3.3)
LYMPHOCYTES # BLD AUTO: 21.7 % — SIGNIFICANT CHANGE UP (ref 13–44)
MCHC RBC-ENTMCNC: 31.1 PG — SIGNIFICANT CHANGE UP (ref 27–34)
MCHC RBC-ENTMCNC: 34.4 GM/DL — SIGNIFICANT CHANGE UP (ref 32–36)
MCV RBC AUTO: 90.3 FL — SIGNIFICANT CHANGE UP (ref 80–100)
MONOCYTES # BLD AUTO: 0.79 K/UL — SIGNIFICANT CHANGE UP (ref 0–0.9)
MONOCYTES NFR BLD AUTO: 7.6 % — SIGNIFICANT CHANGE UP (ref 2–14)
NEUTROPHILS # BLD AUTO: 7.27 K/UL — SIGNIFICANT CHANGE UP (ref 1.8–7.4)
NEUTROPHILS NFR BLD AUTO: 69.5 % — SIGNIFICANT CHANGE UP (ref 43–77)
PLATELET # BLD AUTO: 197 K/UL — SIGNIFICANT CHANGE UP (ref 150–400)
RBC # BLD: 3.73 M/UL — LOW (ref 3.8–5.2)
RBC # FLD: 13.7 % — SIGNIFICANT CHANGE UP (ref 10.3–14.5)
T PALLIDUM AB TITR SER: NEGATIVE — SIGNIFICANT CHANGE UP
WBC # BLD: 10.45 K/UL — SIGNIFICANT CHANGE UP (ref 3.8–10.5)
WBC # FLD AUTO: 10.45 K/UL — SIGNIFICANT CHANGE UP (ref 3.8–10.5)

## 2023-09-02 PROCEDURE — 86901 BLOOD TYPING SEROLOGIC RH(D): CPT

## 2023-09-02 PROCEDURE — 85025 COMPLETE CBC W/AUTO DIFF WBC: CPT

## 2023-09-02 PROCEDURE — 85014 HEMATOCRIT: CPT

## 2023-09-02 PROCEDURE — 86780 TREPONEMA PALLIDUM: CPT

## 2023-09-02 PROCEDURE — 36415 COLL VENOUS BLD VENIPUNCTURE: CPT

## 2023-09-02 PROCEDURE — 85018 HEMOGLOBIN: CPT

## 2023-09-02 PROCEDURE — 86850 RBC ANTIBODY SCREEN: CPT

## 2023-09-02 PROCEDURE — 86900 BLOOD TYPING SEROLOGIC ABO: CPT

## 2023-09-02 RX ORDER — CITRIC ACID/SODIUM CITRATE 300-500 MG
30 SOLUTION, ORAL ORAL ONCE
Refills: 0 | Status: DISCONTINUED | OUTPATIENT
Start: 2023-09-02 | End: 2023-09-02

## 2023-09-02 RX ORDER — CHLORHEXIDINE GLUCONATE 213 G/1000ML
1 SOLUTION TOPICAL DAILY
Refills: 0 | Status: DISCONTINUED | OUTPATIENT
Start: 2023-09-02 | End: 2023-09-02

## 2023-09-02 RX ORDER — OXYCODONE HYDROCHLORIDE 5 MG/1
5 TABLET ORAL ONCE
Refills: 0 | Status: DISCONTINUED | OUTPATIENT
Start: 2023-09-02 | End: 2023-09-04

## 2023-09-02 RX ORDER — HYDROCORTISONE 1 %
1 OINTMENT (GRAM) TOPICAL EVERY 6 HOURS
Refills: 0 | Status: DISCONTINUED | OUTPATIENT
Start: 2023-09-02 | End: 2023-09-04

## 2023-09-02 RX ORDER — ACETAMINOPHEN 500 MG
975 TABLET ORAL
Refills: 0 | Status: DISCONTINUED | OUTPATIENT
Start: 2023-09-02 | End: 2023-09-04

## 2023-09-02 RX ORDER — SIMETHICONE 80 MG/1
80 TABLET, CHEWABLE ORAL EVERY 4 HOURS
Refills: 0 | Status: DISCONTINUED | OUTPATIENT
Start: 2023-09-02 | End: 2023-09-04

## 2023-09-02 RX ORDER — IBUPROFEN 200 MG
600 TABLET ORAL EVERY 6 HOURS
Refills: 0 | Status: DISCONTINUED | OUTPATIENT
Start: 2023-09-02 | End: 2023-09-04

## 2023-09-02 RX ORDER — MAGNESIUM HYDROXIDE 400 MG/1
30 TABLET, CHEWABLE ORAL
Refills: 0 | Status: DISCONTINUED | OUTPATIENT
Start: 2023-09-02 | End: 2023-09-04

## 2023-09-02 RX ORDER — OXYTOCIN 10 UNIT/ML
333.33 VIAL (ML) INJECTION
Qty: 20 | Refills: 0 | Status: DISCONTINUED | OUTPATIENT
Start: 2023-09-02 | End: 2023-09-02

## 2023-09-02 RX ORDER — OXYCODONE HYDROCHLORIDE 5 MG/1
5 TABLET ORAL
Refills: 0 | Status: DISCONTINUED | OUTPATIENT
Start: 2023-09-02 | End: 2023-09-04

## 2023-09-02 RX ORDER — AER TRAVELER 0.5 G/1
1 SOLUTION RECTAL; TOPICAL EVERY 4 HOURS
Refills: 0 | Status: DISCONTINUED | OUTPATIENT
Start: 2023-09-02 | End: 2023-09-04

## 2023-09-02 RX ORDER — SODIUM CHLORIDE 9 MG/ML
3 INJECTION INTRAMUSCULAR; INTRAVENOUS; SUBCUTANEOUS EVERY 8 HOURS
Refills: 0 | Status: DISCONTINUED | OUTPATIENT
Start: 2023-09-02 | End: 2023-09-04

## 2023-09-02 RX ORDER — PRAMOXINE HYDROCHLORIDE 150 MG/15G
1 AEROSOL, FOAM RECTAL EVERY 4 HOURS
Refills: 0 | Status: DISCONTINUED | OUTPATIENT
Start: 2023-09-02 | End: 2023-09-04

## 2023-09-02 RX ORDER — KETOROLAC TROMETHAMINE 30 MG/ML
30 SYRINGE (ML) INJECTION ONCE
Refills: 0 | Status: DISCONTINUED | OUTPATIENT
Start: 2023-09-02 | End: 2023-09-02

## 2023-09-02 RX ORDER — DIPHENHYDRAMINE HCL 50 MG
25 CAPSULE ORAL EVERY 6 HOURS
Refills: 0 | Status: DISCONTINUED | OUTPATIENT
Start: 2023-09-02 | End: 2023-09-04

## 2023-09-02 RX ORDER — LANOLIN
1 OINTMENT (GRAM) TOPICAL EVERY 6 HOURS
Refills: 0 | Status: DISCONTINUED | OUTPATIENT
Start: 2023-09-02 | End: 2023-09-04

## 2023-09-02 RX ORDER — IBUPROFEN 200 MG
600 TABLET ORAL EVERY 6 HOURS
Refills: 0 | Status: COMPLETED | OUTPATIENT
Start: 2023-09-02 | End: 2024-07-31

## 2023-09-02 RX ORDER — TETANUS TOXOID, REDUCED DIPHTHERIA TOXOID AND ACELLULAR PERTUSSIS VACCINE, ADSORBED 5; 2.5; 8; 8; 2.5 [IU]/.5ML; [IU]/.5ML; UG/.5ML; UG/.5ML; UG/.5ML
0.5 SUSPENSION INTRAMUSCULAR ONCE
Refills: 0 | Status: DISCONTINUED | OUTPATIENT
Start: 2023-09-02 | End: 2023-09-04

## 2023-09-02 RX ORDER — BENZOCAINE 10 %
1 GEL (GRAM) MUCOUS MEMBRANE EVERY 6 HOURS
Refills: 0 | Status: DISCONTINUED | OUTPATIENT
Start: 2023-09-02 | End: 2023-09-04

## 2023-09-02 RX ORDER — SODIUM CHLORIDE 9 MG/ML
1000 INJECTION, SOLUTION INTRAVENOUS
Refills: 0 | Status: DISCONTINUED | OUTPATIENT
Start: 2023-09-02 | End: 2023-09-02

## 2023-09-02 RX ORDER — DIBUCAINE 1 %
1 OINTMENT (GRAM) RECTAL EVERY 6 HOURS
Refills: 0 | Status: DISCONTINUED | OUTPATIENT
Start: 2023-09-02 | End: 2023-09-04

## 2023-09-02 RX ORDER — OXYTOCIN 10 UNIT/ML
41.67 VIAL (ML) INJECTION
Qty: 20 | Refills: 0 | Status: DISCONTINUED | OUTPATIENT
Start: 2023-09-02 | End: 2023-09-04

## 2023-09-02 RX ADMIN — SODIUM CHLORIDE 3 MILLILITER(S): 9 INJECTION INTRAMUSCULAR; INTRAVENOUS; SUBCUTANEOUS at 22:46

## 2023-09-02 RX ADMIN — Medication 1 TABLET(S): at 09:06

## 2023-09-02 RX ADMIN — Medication 1000 MILLIUNIT(S)/MIN: at 02:47

## 2023-09-02 RX ADMIN — Medication 975 MILLIGRAM(S): at 09:06

## 2023-09-02 RX ADMIN — Medication 30 MILLIGRAM(S): at 02:59

## 2023-09-02 RX ADMIN — Medication 30 MILLIGRAM(S): at 04:22

## 2023-09-02 RX ADMIN — SODIUM CHLORIDE 3 MILLILITER(S): 9 INJECTION INTRAMUSCULAR; INTRAVENOUS; SUBCUTANEOUS at 08:07

## 2023-09-02 NOTE — PATIENT PROFILE OB - AMNIOTIC FLUID ODOR, LABOR
To help firm up the stools and give you more sensation when the rectum is being distended, I will ask that you start metamucil 1 rounded tablespoon in 8 ounces of water twice daily.    Return in 1 month and see if the problem has resolved.    Bring a bag of your meds.    CAD stable.   Atherosclerosis of the aorta, stable and followed by Cards.  CHF, compensated.  Thrombophilia.  Remains on warfarin for his hx of PE.  Adrenal insufficiency stable.  CKD stable.  PMR stable.         
normal

## 2023-09-02 NOTE — PATIENT PROFILE OB - PRO FEEDING PLAN INFANT OB
PATIENT CALLED AND REQUESTING A REFILL OF PAIN MEDS.  MIRELA ROSE   initiation of breastfeeding/breast milk feeding

## 2023-09-03 ENCOUNTER — TRANSCRIPTION ENCOUNTER (OUTPATIENT)
Age: 28
End: 2023-09-03

## 2023-09-03 LAB
HCT VFR BLD CALC: 34.2 % — LOW (ref 34.5–45)
HGB BLD-MCNC: 11.7 G/DL — SIGNIFICANT CHANGE UP (ref 11.5–15.5)

## 2023-09-03 RX ORDER — IBUPROFEN 200 MG
1 TABLET ORAL
Qty: 28 | Refills: 0
Start: 2023-09-03 | End: 2023-09-09

## 2023-09-03 RX ORDER — ACETAMINOPHEN 500 MG
3 TABLET ORAL
Qty: 84 | Refills: 0
Start: 2023-09-03 | End: 2023-09-09

## 2023-09-03 RX ADMIN — Medication 600 MILLIGRAM(S): at 06:17

## 2023-09-03 RX ADMIN — Medication 975 MILLIGRAM(S): at 21:52

## 2023-09-03 RX ADMIN — Medication 1 TABLET(S): at 12:20

## 2023-09-03 RX ADMIN — Medication 600 MILLIGRAM(S): at 06:47

## 2023-09-03 RX ADMIN — SODIUM CHLORIDE 3 MILLILITER(S): 9 INJECTION INTRAMUSCULAR; INTRAVENOUS; SUBCUTANEOUS at 06:47

## 2023-09-03 RX ADMIN — Medication 975 MILLIGRAM(S): at 21:24

## 2023-09-03 NOTE — DISCHARGE NOTE OB - NS MD DC FALL RISK RISK
For information on Fall & Injury Prevention, visit: [email or web address] OR  [email or web address] OR  https://www.cdc.gov/steadi/patient.html

## 2023-09-03 NOTE — DISCHARGE NOTE OB - CARE PROVIDER_API CALL
Masha Cheney  Obstetrics and Gynecology  [address] [place] [address]  Phone: [phone]  Fax: [phone]  Follow Up Time: 1 month

## 2023-09-03 NOTE — PROGRESS NOTE ADULT - ASSESSMENT
A/P:  AGUSTINA YEE is a 27yo  now PPD#1 s/p spontaneous vaginal delivery at 38 weeks gestation.    -Vital signs stable  -Postpartum H&H stable/pending  -Voiding, tolerating PO, bowel function nml   -Advance care as tolerated   -Continue routine postpartum care and education.  -Healthy male infant, desires/declines circumcision.    Dispo: Patient to be discharged when meeting all postpartum and postoperative milestone and pending attending approval.     A/P:  AGUSTINA YEE is a 29yo  now PPD#1 s/p spontaneous vaginal delivery at 38 weeks gestation.    -Vital signs stable  -Postpartum H&H stable 11.7/34.2  -Voiding, tolerating PO, bowel function nml   -Advance care as tolerated   -Continue routine postpartum care and education.  -Healthy female infant    Dispo: Patient to be discharged when meeting all postpartum and postoperative milestone and pending attending approval.

## 2023-09-03 NOTE — DISCHARGE NOTE OB - MATERIALS PROVIDED
Vaccinations/[facility]  Screening Program/  Immunization Record/Breastfeeding Log/Breastfeeding Mother’s Support Group Information/Guide to Postpartum Care/[facility] Hearing Screen Program/Back To Sleep Handout/Shaken Baby Prevention Handout/Breastfeeding Guide and Packet

## 2023-09-03 NOTE — DISCHARGE NOTE OB - IF YOU ARE A SMOKER, IT IS IMPORTANT FOR YOUR HEALTH TO STOP SMOKING. PLEASE BE AWARE THAT SECOND HAND SMOKE IS ALSO HARMFUL.
[employer] account. Enter Z427 in the [facility] [facility] ([facility]  [email or web address] and sign in to your Ob[employer]idea account. Enter J438 in the [employer] [facility] (Saint Louise Regional 

## 2023-09-03 NOTE — DISCHARGE NOTE OB - NS AS DC STROKE DX YN
CERTIFICATE OF WORK        5/20/2021      Regarding: Blade Valencia  [address] [facility] [place] [address]      This is to certify that Blade Valencia has been under my care from5/20/2021 and can return to light work on 05/21/2021.    RESTRICTIONS: 50 pound overhead lifting, pushing, and pulling restriction and with arm away from his side (abduction), until re-evaluated in 8-12 weeks.      REMARKS: None        SIGNATURE:___________________________________________,   5/20/2021  [facility]  [address] [place] [address]  Dept Phone: [phone]  Dept Fax: [phone]  
no

## 2023-09-03 NOTE — DISCHARGE NOTE OB - PATIENT PORTAL LINK FT
You can access the FollowMyHealth Patient Portal offered by [facility] by registering at the following website: [email or web address]. By joining [employer]’s FollowMyHealth portal, you will also be able to view your health information using other applications (apps) compatible with our system.

## 2023-09-03 NOTE — DISCHARGE NOTE OB - MEDICATION SUMMARY - MEDICATIONS TO TAKE
I will START or STAY ON the medications listed below when I get home from the hospital:    ibuprofen 600 mg oral tablet  -- 1 tab(s) by mouth every 6 hours as needed for  moderate pain  -- Indication: For pain    Tylenol 325 mg oral capsule  -- 3 cap(s) by mouth every 6 hours as needed for  moderate pain  -- Indication: For pain    ibuprofen 600 mg oral tablet  -- 1 tab(s) by mouth every 6 hours as needed for  moderate pain  -- Indication: For pain    Tylenol 325 mg oral capsule  -- 3 cap(s) by mouth every 6 hours as needed for  moderate pain  -- Indication: For pain

## 2023-09-03 NOTE — PROGRESS NOTE ADULT - SUBJECTIVE AND OBJECTIVE BOX
AGUSTINA YEE is a 27yo  now PPD#1 s/p spontaneous vaginal delivery at 38 weeks gestation.    S:    The patient has no complaints.  Pain controlled with current medications.   She is ambulating without difficulty and tolerating PO   + flatus/-BM/+ voiding   Lochia wnl/scant/heavy   Denies fever, chills, nausea, vomiting   Denies lightheadedness, dizziness, palpitation, chest pain, SOB   Breast/bottle feeding       O:    T(C): 36.8 (23 @ 23:54), Max: 37 (23 @ 19:50)  HR: 63 (23 @ 23:54) (63 - 74)  BP: 111/66 (23 @ 23:54) (111/57 - 112/69)  RR: 18 (23 @ 23:54) (17 - 18)  SpO2: 98% (23 @ 23:54) (98% - 100%)    Gen: NAD, AOx3  CV: RRR  Pulm: CTAB  Breast: nontender, non-engorged   Abdomen:  soft, non-tender, non-distended, +bowel sounds.  Uterus:  Fundus firm below umbilicus  VE:  +lochia  Ext:  Non-tender.                          11.6   10.45 )-----------( 197      ( 02 Sep 2023 01:00 )             33.7            AGUSTINA YEE is a 27yo  now PPD#1 s/p spontaneous vaginal delivery at 38 weeks gestation.    : Seamus, #[id number], [place]    S:    The patient has no complaints.  Pain controlled with current medications.   She is ambulating without difficulty and tolerating PO   + flatus/+BM/+ voiding   Lochia wnl  Denies fever, chills, nausea, vomiting   Denies lightheadedness, dizziness, palpitation, chest pain, SOB   Breast and bottle feeding       O:    T(C): 36.8 (23 @ 23:54), Max: 37 (23 @ 19:50)  HR: 63 (23 @ 23:54) (63 - 74)  BP: 111/66 (23 @ 23:54) (111/57 - 112/69)  RR: 18 (23 @ 23:54) (17 - 18)  SpO2: 98% (23 @ 23:54) (98% - 100%)    Gen: NAD, AOx3  CV: RRR  Pulm: CTAB  Breast: nontender, non-engorged   Abdomen:  soft, non-tender, non-distended, +bowel sounds.  Uterus:  Fundus firm below umbilicus  VE:  expectant lochia  Ext:  Non-tender.                 LABS: Personally reviewed  CBC                        11.7   x     )-----------( x        ( 03 Sep 2023 07:40 )             34.2                                                                        AGUSTINA YEE is a 29yo  now PPD#1 s/p spontaneous vaginal delivery at 38 weeks gestation.    : Seamus, #[id number], [place]    S:    The patient has no complaints.  Pain controlled with current medications.   She is ambulating without difficulty and tolerating PO   + flatus/+BM/+ voiding   Lochia wnl  Denies fever, chills, nausea, vomiting   Denies lightheadedness, dizziness, palpitation, chest pain, SOB   Breast and bottle feeding       O:    T(C): 36.8 (23 @ 23:54), Max: 37 (23 @ 19:50)  HR: 63 (23 @ 23:54) (63 - 74)  BP: 111/66 (23 @ 23:54) (111/57 - 112/69)  RR: 18 (23 @ 23:54) (17 - 18)  SpO2: 98% (23 @ 23:54) (98% - 100%)    Gen: NAD, AOx3  CV: RRR  Pulm: CTAB  Breast: nontender, non-engorged   Abdomen:  soft, non-tender, non-distended, +bowel sounds.  Uterus:  Fundus firm below umbilicus  VE:  expectant lochia  Ext:  Non-tender.                 LABS: Personally reviewed  CBC                        11.7   x     )-----------( x        ( 03 Sep 2023 07:40 )             34.2

## 2023-09-04 VITALS
SYSTOLIC BLOOD PRESSURE: 106 MMHG | DIASTOLIC BLOOD PRESSURE: 73 MMHG | TEMPERATURE: 98 F | RESPIRATION RATE: 16 BRPM | OXYGEN SATURATION: 98 % | HEART RATE: 71 BPM

## 2023-09-04 RX ADMIN — Medication 600 MILLIGRAM(S): at 06:55

## 2023-09-04 RX ADMIN — Medication 600 MILLIGRAM(S): at 00:09

## 2023-09-04 RX ADMIN — Medication 600 MILLIGRAM(S): at 06:26

## 2023-09-04 NOTE — PROGRESS NOTE ADULT - SUBJECTIVE AND OBJECTIVE BOX
AGUSTINA YEE is a 28y   s/p  @38w  PPD1    SUBJECTIVE:  No acute events overnight.  Patient has no complaints.  Pain is well controlled.  +flatus, + voiding, +bm.  Ambulating and tolerating PO.  Appropriate lochia.      OBJECTIVE:  Physical exam:  General: AOx3, NAD.  Heart: RRR  Lungs: CTAB  Abdomen: Soft, appropriately tender to palpitation, fundus firm.  Vaginal: expectant lochia  Ext: No DVT signs, warm extremities.    Vital Signs Last 24 Hrs  T(C): 36.7 (03 Sep 2023 19:30), Max: 36.7 (03 Sep 2023 09:15)  T(F): 98.1 (03 Sep 2023 19:30), Max: 98.1 (03 Sep 2023 19:30)  HR: 68 (03 Sep 2023 19:30) (62 - 68)  BP: 109/53 (03 Sep 2023 19:30) (98/59 - 109/53)  BP(mean): --  RR: 16 (03 Sep 2023 19:30) (16 - 17)  SpO2: 98% (03 Sep 2023 19:30) (98% - 98%)    Parameters below as of 03 Sep 2023 19:30  Patient On (Oxygen Delivery Method): room air        LABS:                        11.7   x     )-----------( x        ( 03 Sep 2023 07:40 )             34.2

## 2023-09-04 NOTE — PROGRESS NOTE ADULT - ATTENDING COMMENTS
Patient  without complaints  tolerating diet, passing flatus   heart and lungs wnl  abdomen soft, uterus well contracted  normal lochial flow  no calf tenderness  continue same management  ambulate regular diet

## 2023-09-04 NOTE — PROGRESS NOTE ADULT - ASSESSMENT
Medication:   Requested Prescriptions     Pending Prescriptions Disp Refills    celecoxib (CELEBREX) 200 MG capsule [Pharmacy Med Name: CELECOXIB 200 MG CAPSULE] 52 capsule 1     Sig: TAKE ONE CAPSULE BY MOUTH TWICE A DAY WITH MEALS        Last Filled:  4/5/2021 #52 Refills 0    Patient Phone Number: [phone] (home)     Last appt: 4/13/2021   Return in about 6 months (around 10/13/2021) for Medication Refill, Chronic conditions. Next appt: 10/14/2021    Last OARRS:   RX Monitoring 4/13/2021   Attestation -   Periodic Controlled Substance Monitoring Possible medication side effects, risk of tolerance/dependence & alternative treatments discussed. ;No signs of potential drug abuse or diversion identified. A/P: AGUSTINA YEE is a 28y   s/p  @38w  PPD1    #Routine post partum care  - Stable, doing well postpartum  - Hgb 11.6 > 11.7   - Pain: well controlled on PO pain meds  - GI: regular diet, normal bowel function   - : voiding , lochia decreasing   - DVT ppx: Ambulation encouraged

## 2023-09-08 DIAGNOSIS — Z3A.38 38 WEEKS GESTATION OF PREGNANCY: ICD-10-CM

## 2023-10-16 ENCOUNTER — RESULT REVIEW (OUTPATIENT)
Age: 28
End: 2023-10-16

## 2023-10-31 NOTE — DISCHARGE NOTE OB - BREASTFEEDING PROVIDES MATERNAL HEALTH BENEFITS, DECREASED PREMENOPAUSAL BREAST CANCER, OVARIAN CANCER AND TYPE II DIABETES MELLITUS
OB History    Para Term  AB Living   3 1 0 1 2 1   SAB IAB Ectopic Multiple Live Births   2 0 0 0 1      # Outcome Date GA Lbr Familia/2nd Weight Sex Delivery Anes PTL Lv   3  22 36w4d 07:37 / 00:56 2.721 kg (6 lb) F Vag-Spont EPI Y GINGER      Name: ALLIE DEJESUS      Apgar1: 7  Apgar5: 9   2 SAB            1 SAB              Past Medical History:   Diagnosis Date    Acne 2011    Anorexia 2009    exercise    Anxiety disorder, unspecified     Dysmenorrhea 2011    CHETNA (generalized anxiety disorder) 2009    Hypercoagulable state 2018    MTH4 syndrome and NAVI-1 syndrome    Migraine 2009    PCOS (polycystic ovarian syndrome) 2017    Retroflexion of uterus 2011    2nd degree    Vulvar intraepithelial neoplasia (SHANNON) grade 1 2012     Past [facility] History:   Procedure Laterality Date    COLPOSCOPY  2015    no lesion found for ASCUS; cervical eversion    LAPAROSCOPIC SALPINGECTOMY  2022    Procedure: SALPINGECTOMY, LAPAROSCOPIC;  Surgeon: Viral Girard DO;  Location: [facility];  Service: OB/GYN;;    Laser ablation of vulvar condyloma  2012    NASAL FRACTURE SURGERY      SHOULDER SURGERY      Tubes in ears         No medications prior to admission.       Review of patient's allergies indicates:  No Known Allergies     Family History       Problem Relation (Age of Onset)    Diabetes Paternal Grandfather    Endometriosis Other    Heart disease Paternal Grandfather    Prostate cancer Maternal Grandfather    Stroke Maternal Grandmother    Ulcerative colitis Maternal Grandmother          Tobacco Use    Smoking status: Former     Types: Vaping with nicotine     Start date:      Quit date:      Years since quittin.8    Smokeless tobacco: Never   Substance and Sexual Activity    Alcohol use: Yes     Comment: rarely    Drug use: Never    Sexual activity: Yes     Partners: Male     Birth  control/protection: None     Review of Systems   All other systems reviewed and are negative.     Objective:     Vital Signs (Most Recent):  Temp: 98 °F (36.7 °C) (10/31/23 0910)  Pulse: 66 (10/31/23 0910)  Resp: 16 (10/31/23 0910)  BP: 105/76 (10/31/23 0911)  SpO2: 99 % (10/31/23 0910) Vital Signs (24h Range):  Temp:  [98 °F (36.7 °C)] 98 °F (36.7 °C)  Pulse:  [66] 66  Resp:  [16] 16  SpO2:  [99 %] 99 %  BP: (105)/(76) 105/76     Weight: 52.2 kg (115 lb)  Body mass index is 21.03 kg/m².    No LMP recorded.     Physical Exam:   Constitutional: She is oriented to person, place, and time. She appears well-developed and well-nourished.    HENT:   Head: Normocephalic and atraumatic.      Cardiovascular:  Normal rate.             Pulmonary/Chest: Effort normal.        Abdominal: Soft. There is no abdominal tenderness.     Genitourinary:    Uterus normal.             Musculoskeletal: Normal range of motion.       Neurological: She is alert and oriented to person, place, and time.    Skin: Skin is warm and dry.    Psychiatric: She has a normal mood and affect. Her behavior is normal. Judgment and thought content normal.        Laboratory:  CBC:   Recent Labs   Lab 10/30/23  0946   WBC 7.04   RBC 4.63   HGB 14.6   HCT 42.6      MCV 92.0   MCH 31.5*   MCHC 34.3     Recent Lab Results         10/31/23  0906        Preg Test, Ur Negative        Acceptable Yes             I have personallly reviewed all pertinent lab results from the last 24 hours.    Diagnostic Results:  Labs: Reviewed   Statement Selected

## 2024-01-16 NOTE — ED STATDOCS - NS ED ATTENDING STATEMENT MOD
This was a shared visit with the JAZLYN. I reviewed and verified the documentation and independently performed the documented:
No

## 2024-02-12 NOTE — DISCHARGE NOTE OB - REDNESS, SWELLING, YELLOW-GREEN OR BLOODY DISCHARGE FROM YOUR INCISION
Answers submitted by the patient for this visit:  Painful Urination Questionnaire (Submitted on 2/12/2024)  Chief Complaint: Dysuria  Chronicity: new  Onset: in the past 7 days  Frequency: every urination  Progression since onset: gradually improving  Pain quality: burning  Pain - numeric: 4/10  Sexually active?: Yes  History of pyelonephritis?: No  discharge: No  flank pain: No  frequency: Yes  hematuria: No  hesitancy: No  nausea: No  possible pregnancy: No  sweats: No  urgency: Yes  vomiting: No     Statement Selected

## 2024-03-09 ENCOUNTER — NON-APPOINTMENT (OUTPATIENT)
Age: 29
End: 2024-03-09

## 2024-09-10 ENCOUNTER — APPOINTMENT (OUTPATIENT)
Dept: CARDIOLOGY | Facility: CLINIC | Age: 29
End: 2024-09-10
Payer: MEDICAID

## 2024-09-10 VITALS
WEIGHT: 168 LBS | DIASTOLIC BLOOD PRESSURE: 78 MMHG | HEIGHT: 60 IN | HEART RATE: 81 BPM | SYSTOLIC BLOOD PRESSURE: 106 MMHG | BODY MASS INDEX: 32.98 KG/M2 | OXYGEN SATURATION: 97 %

## 2024-09-10 DIAGNOSIS — R01.1 CARDIAC MURMUR, UNSPECIFIED: ICD-10-CM

## 2024-09-10 DIAGNOSIS — R07.89 OTHER CHEST PAIN: ICD-10-CM

## 2024-09-10 DIAGNOSIS — R00.2 PALPITATIONS: ICD-10-CM

## 2024-09-10 PROCEDURE — 99204 OFFICE O/P NEW MOD 45 MIN: CPT | Mod: 25

## 2024-09-10 PROCEDURE — 93000 ELECTROCARDIOGRAM COMPLETE: CPT

## 2024-09-10 NOTE — PHYSICAL EXAM
[Well Developed] : well developed [Well Nourished] : well nourished [No Acute Distress] : no acute distress [Normal Conjunctiva] : normal conjunctiva [Normal Venous Pressure] : normal venous pressure [No Carotid Bruit] : no carotid bruit [Normal S1, S2] : normal S1, S2 [No Rub] : no rub [No Gallop] : no gallop [Clear Lung Fields] : clear lung fields [Good Air Entry] : good air entry [No Respiratory Distress] : no respiratory distress  [Soft] : abdomen soft [Non Tender] : non-tender [No Masses/organomegaly] : no masses/organomegaly [Normal Bowel Sounds] : normal bowel sounds [Normal Gait] : normal gait [No Edema] : no edema [No Cyanosis] : no cyanosis [No Clubbing] : no clubbing [No Varicosities] : no varicosities [No Rash] : no rash [No Skin Lesions] : no skin lesions [Moves all extremities] : moves all extremities [No Focal Deficits] : no focal deficits [Normal Speech] : normal speech [Alert and Oriented] : alert and oriented [Normal memory] : normal memory [de-identified] : 1-3/6 murmur on exam.....

## 2024-09-10 NOTE — ASSESSMENT
[FreeTextEntry1] : A/P:  *Murmur *palpitations.  -Echo -MCOT 30 days -EST  Return in 2 weeks to review results. pt going out of country mon 16th leaving returning 2nd oct. will schedule all of testing after return. Monitor on oct 8th.  Return Oct 15th.

## 2024-09-10 NOTE — HISTORY OF PRESENT ILLNESS
[FreeTextEntry1] : millcient [id number] .  [id number]  AGUSTINA YEE  Jan 2 1995 [phone]   "Tachycardia-pt prefer apt"  pt requested visit - not referred by [facility]. requested visit b/c she was told she had a "heart murmur"   at age 14 yrs.  was told to get regular checkups but pt did not. was told in this country about murmur echo was done at age 14 yr.  also pt reports palpitations almost every day. duration 3 minutes.  Onset 1 month ago. sometiems worse w/ activity. no dyspnea or chest pain.  med/surg hx neg she reports sibs also have problem w "heart murmur" sochx neg.  ECG NSR no ischemic changes. 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

## 2024-10-02 NOTE — DISCHARGE NOTE OB - INCREASED VAGINAL BLEEDING OR LARGE CLOTS (SATURATING A PAD AN HOUR)
Continue with medication and monitor  Lantus asa. Alogliptin metformin   Acc ck  Ada diet  Follow up with pcp  Follow up with endocrine  No new issues   Statement Selected

## 2024-10-06 ENCOUNTER — EMERGENCY (EMERGENCY)
Facility: HOSPITAL | Age: 29
LOS: 0 days | Discharge: HOME | End: 2024-10-06
Attending: STUDENT IN AN ORGANIZED HEALTH CARE EDUCATION/TRAINING PROGRAM
Payer: MEDICAID

## 2024-10-06 VITALS
SYSTOLIC BLOOD PRESSURE: 101 MMHG | RESPIRATION RATE: 16 BRPM | DIASTOLIC BLOOD PRESSURE: 52 MMHG | OXYGEN SATURATION: 100 % | HEART RATE: 77 BPM | TEMPERATURE: 98 F

## 2024-10-06 VITALS — WEIGHT: 164.02 LBS

## 2024-10-06 DIAGNOSIS — R50.9 FEVER, UNSPECIFIED: ICD-10-CM

## 2024-10-06 DIAGNOSIS — R51.9 HEADACHE, UNSPECIFIED: ICD-10-CM

## 2024-10-06 DIAGNOSIS — N39.0 URINARY TRACT INFECTION, SITE NOT SPECIFIED: ICD-10-CM

## 2024-10-06 LAB
ALBUMIN SERPL ELPH-MCNC: 4.2 G/DL — SIGNIFICANT CHANGE UP (ref 3.3–5)
ALP SERPL-CCNC: 88 U/L — SIGNIFICANT CHANGE UP (ref 40–120)
ALT FLD-CCNC: 18 U/L — SIGNIFICANT CHANGE UP (ref 12–78)
ANION GAP SERPL CALC-SCNC: 7 MMOL/L — SIGNIFICANT CHANGE UP (ref 5–17)
APPEARANCE UR: ABNORMAL
APPEARANCE UR: ABNORMAL
APTT BLD: 37.5 SEC — HIGH (ref 24.5–35.6)
AST SERPL-CCNC: 18 U/L — SIGNIFICANT CHANGE UP (ref 15–37)
BACTERIA # UR AUTO: ABNORMAL /HPF
BACTERIA # UR AUTO: ABNORMAL /HPF
BASOPHILS # BLD AUTO: 0.01 K/UL — SIGNIFICANT CHANGE UP (ref 0–0.2)
BASOPHILS NFR BLD AUTO: 0.2 % — SIGNIFICANT CHANGE UP (ref 0–2)
BILIRUB SERPL-MCNC: 0.2 MG/DL — SIGNIFICANT CHANGE UP (ref 0.2–1.2)
BILIRUB UR-MCNC: ABNORMAL
BILIRUB UR-MCNC: NEGATIVE — SIGNIFICANT CHANGE UP
BUN SERPL-MCNC: 12 MG/DL — SIGNIFICANT CHANGE UP (ref 7–23)
CALCIUM SERPL-MCNC: 9.2 MG/DL — SIGNIFICANT CHANGE UP (ref 8.5–10.1)
CAST: 52 /LPF — HIGH (ref 0–4)
CAST: >63 /LPF — HIGH (ref 0–4)
CHLORIDE SERPL-SCNC: 105 MMOL/L — SIGNIFICANT CHANGE UP (ref 96–108)
CO2 SERPL-SCNC: 24 MMOL/L — SIGNIFICANT CHANGE UP (ref 22–31)
COLOR SPEC: SIGNIFICANT CHANGE UP
COLOR SPEC: YELLOW — SIGNIFICANT CHANGE UP
COMMENT - URINE: SIGNIFICANT CHANGE UP
COMMENT - URINE: SIGNIFICANT CHANGE UP
CREAT SERPL-MCNC: 0.92 MG/DL — SIGNIFICANT CHANGE UP (ref 0.5–1.3)
DIFF PNL FLD: ABNORMAL
DIFF PNL FLD: ABNORMAL
EGFR: 86 ML/MIN/1.73M2 — SIGNIFICANT CHANGE UP
EOSINOPHIL # BLD AUTO: 0 K/UL — SIGNIFICANT CHANGE UP (ref 0–0.5)
EOSINOPHIL NFR BLD AUTO: 0 % — SIGNIFICANT CHANGE UP (ref 0–6)
EPI CELLS # UR: PRESENT
FLUAV AG NPH QL: SIGNIFICANT CHANGE UP
FLUBV AG NPH QL: SIGNIFICANT CHANGE UP
GLUCOSE SERPL-MCNC: 85 MG/DL — SIGNIFICANT CHANGE UP (ref 70–99)
GLUCOSE UR QL: NEGATIVE MG/DL — SIGNIFICANT CHANGE UP
GLUCOSE UR QL: NEGATIVE MG/DL — SIGNIFICANT CHANGE UP
HCT VFR BLD CALC: 44.5 % — SIGNIFICANT CHANGE UP (ref 34.5–45)
HGB BLD-MCNC: 14.5 G/DL — SIGNIFICANT CHANGE UP (ref 11.5–15.5)
HYALINE CASTS # UR AUTO: PRESENT
HYALINE CASTS # UR AUTO: PRESENT
IMM GRANULOCYTES NFR BLD AUTO: 0.2 % — SIGNIFICANT CHANGE UP (ref 0–0.9)
INR BLD: 1.11 RATIO — SIGNIFICANT CHANGE UP (ref 0.85–1.16)
KETONES UR-MCNC: 40 MG/DL
KETONES UR-MCNC: 40 MG/DL
LACTATE SERPL-SCNC: 0.6 MMOL/L — LOW (ref 0.7–2)
LEUKOCYTE ESTERASE UR-ACNC: ABNORMAL
LEUKOCYTE ESTERASE UR-ACNC: NEGATIVE — SIGNIFICANT CHANGE UP
LYMPHOCYTES # BLD AUTO: 1.14 K/UL — SIGNIFICANT CHANGE UP (ref 1–3.3)
LYMPHOCYTES # BLD AUTO: 21.7 % — SIGNIFICANT CHANGE UP (ref 13–44)
MCHC RBC-ENTMCNC: 29.2 PG — SIGNIFICANT CHANGE UP (ref 27–34)
MCHC RBC-ENTMCNC: 32.6 GM/DL — SIGNIFICANT CHANGE UP (ref 32–36)
MCV RBC AUTO: 89.7 FL — SIGNIFICANT CHANGE UP (ref 80–100)
MONOCYTES # BLD AUTO: 0.55 K/UL — SIGNIFICANT CHANGE UP (ref 0–0.9)
MONOCYTES NFR BLD AUTO: 10.5 % — SIGNIFICANT CHANGE UP (ref 2–14)
NEUTROPHILS # BLD AUTO: 3.55 K/UL — SIGNIFICANT CHANGE UP (ref 1.8–7.4)
NEUTROPHILS NFR BLD AUTO: 67.4 % — SIGNIFICANT CHANGE UP (ref 43–77)
NITRITE UR-MCNC: NEGATIVE — SIGNIFICANT CHANGE UP
NITRITE UR-MCNC: NEGATIVE — SIGNIFICANT CHANGE UP
PH UR: 5.5 — SIGNIFICANT CHANGE UP (ref 5–8)
PH UR: 5.5 — SIGNIFICANT CHANGE UP (ref 5–8)
PLATELET # BLD AUTO: 183 K/UL — SIGNIFICANT CHANGE UP (ref 150–400)
POTASSIUM SERPL-MCNC: 3.5 MMOL/L — SIGNIFICANT CHANGE UP (ref 3.5–5.3)
POTASSIUM SERPL-SCNC: 3.5 MMOL/L — SIGNIFICANT CHANGE UP (ref 3.5–5.3)
PROT SERPL-MCNC: 8.4 GM/DL — HIGH (ref 6–8.3)
PROT UR-MCNC: 100 MG/DL
PROT UR-MCNC: 30 MG/DL
PROTHROM AB SERPL-ACNC: 12.7 SEC — SIGNIFICANT CHANGE UP (ref 9.9–13.4)
RBC # BLD: 4.96 M/UL — SIGNIFICANT CHANGE UP (ref 3.8–5.2)
RBC # FLD: 13.4 % — SIGNIFICANT CHANGE UP (ref 10.3–14.5)
RBC CASTS # UR COMP ASSIST: 4 /HPF — SIGNIFICANT CHANGE UP (ref 0–4)
RBC CASTS # UR COMP ASSIST: 6 /HPF — HIGH (ref 0–4)
RSV RNA NPH QL NAA+NON-PROBE: SIGNIFICANT CHANGE UP
SARS-COV-2 RNA SPEC QL NAA+PROBE: SIGNIFICANT CHANGE UP
SODIUM SERPL-SCNC: 136 MMOL/L — SIGNIFICANT CHANGE UP (ref 135–145)
SP GR SPEC: >1.03 — HIGH (ref 1–1.03)
SP GR SPEC: >1.03 — HIGH (ref 1–1.03)
SQUAMOUS # UR AUTO: 22 /HPF — HIGH (ref 0–5)
SQUAMOUS # UR AUTO: 34 /HPF — HIGH (ref 0–5)
UROBILINOGEN FLD QL: 1 MG/DL — SIGNIFICANT CHANGE UP (ref 0.2–1)
UROBILINOGEN FLD QL: 1 MG/DL — SIGNIFICANT CHANGE UP (ref 0.2–1)
WBC # BLD: 5.26 K/UL — SIGNIFICANT CHANGE UP (ref 3.8–10.5)
WBC # FLD AUTO: 5.26 K/UL — SIGNIFICANT CHANGE UP (ref 3.8–10.5)
WBC UR QL: 41 /HPF — HIGH (ref 0–5)
WBC UR QL: 96 /HPF — HIGH (ref 0–5)

## 2024-10-06 PROCEDURE — 83605 ASSAY OF LACTIC ACID: CPT

## 2024-10-06 PROCEDURE — 85730 THROMBOPLASTIN TIME PARTIAL: CPT

## 2024-10-06 PROCEDURE — 93010 ELECTROCARDIOGRAM REPORT: CPT

## 2024-10-06 PROCEDURE — 93005 ELECTROCARDIOGRAM TRACING: CPT

## 2024-10-06 PROCEDURE — 71046 X-RAY EXAM CHEST 2 VIEWS: CPT | Mod: 26

## 2024-10-06 PROCEDURE — 85610 PROTHROMBIN TIME: CPT

## 2024-10-06 PROCEDURE — 87086 URINE CULTURE/COLONY COUNT: CPT

## 2024-10-06 PROCEDURE — 86790 VIRUS ANTIBODY NOS: CPT

## 2024-10-06 PROCEDURE — 81001 URINALYSIS AUTO W/SCOPE: CPT

## 2024-10-06 PROCEDURE — 85025 COMPLETE CBC W/AUTO DIFF WBC: CPT

## 2024-10-06 PROCEDURE — 80053 COMPREHEN METABOLIC PANEL: CPT

## 2024-10-06 PROCEDURE — 0241U: CPT

## 2024-10-06 PROCEDURE — 87040 BLOOD CULTURE FOR BACTERIA: CPT

## 2024-10-06 PROCEDURE — 99284 EMERGENCY DEPT VISIT MOD MDM: CPT

## 2024-10-06 PROCEDURE — 99285 EMERGENCY DEPT VISIT HI MDM: CPT | Mod: 25

## 2024-10-06 PROCEDURE — 36415 COLL VENOUS BLD VENIPUNCTURE: CPT

## 2024-10-06 PROCEDURE — 71046 X-RAY EXAM CHEST 2 VIEWS: CPT

## 2024-10-06 PROCEDURE — 96374 THER/PROPH/DIAG INJ IV PUSH: CPT | Mod: XU

## 2024-10-06 PROCEDURE — 96375 TX/PRO/DX INJ NEW DRUG ADDON: CPT

## 2024-10-06 RX ORDER — ACETAMINOPHEN 325 MG
1000 TABLET ORAL ONCE
Refills: 0 | Status: COMPLETED | OUTPATIENT
Start: 2024-10-06 | End: 2024-10-06

## 2024-10-06 RX ORDER — CEFTRIAXONE SODIUM 1 G
1000 VIAL (EA) INJECTION ONCE
Refills: 0 | Status: COMPLETED | OUTPATIENT
Start: 2024-10-06 | End: 2024-10-06

## 2024-10-06 RX ORDER — CEFPODOXIME PROXETIL 50 MG/5 ML
1 SUSPENSION, RECONSTITUTED, ORAL (ML) ORAL
Qty: 14 | Refills: 0
Start: 2024-10-06 | End: 2024-10-12

## 2024-10-06 RX ORDER — SODIUM CHLORIDE 0.9 % (FLUSH) 0.9 %
2300 SYRINGE (ML) INJECTION ONCE
Refills: 0 | Status: COMPLETED | OUTPATIENT
Start: 2024-10-06 | End: 2024-10-06

## 2024-10-06 RX ADMIN — Medication 400 MILLIGRAM(S): at 14:26

## 2024-10-06 RX ADMIN — Medication 2300 MILLILITER(S): at 14:26

## 2024-10-06 RX ADMIN — Medication 1000 MILLIGRAM(S): at 16:29

## 2024-10-06 NOTE — ED ADULT NURSE NOTE - OBJECTIVE STATEMENT
Pt is a 29yr old female, A&OX4 and ambulatory, c/o subjective fever and body aches x4 days. Notes she had recent travel from [facility]. Took Motrin at 9am today with minimal relief. Denies chest pain, SOB, HA, dizziness, abd. pain, N/V/D, cough, and urinary symptoms. Labs drawn and sent by ANKITA Winter as per MD order. Pt in no acute distress. Brought to results waiting.

## 2024-10-06 NOTE — ED STATDOCS - PATIENT PORTAL LINK FT
You can access the FollowMyHealth Patient Portal offered by [facility] by registering at the following website: [email or web address]. By joining [employer]’s FollowMyHealth portal, you will also be able to view your health information using other applications (apps) compatible with our system.

## 2024-10-06 NOTE — ED ADULT TRIAGE NOTE - NS ED NURSE BANDS TYPE
Name band;
Strong peripheral pulses/Capillary refill less/equal to 2 seconds/Bounding peripheral pulses

## 2024-10-06 NOTE — ED ADULT TRIAGE NOTE - CHIEF COMPLAINT QUOTE
Pt presents to ED c/o fever, headache body aches, and nose bleeds x4days. Denies abdominal pain, back pain, urinary symptoms, cough

## 2024-10-06 NOTE — ED STATDOCS - CLINICAL SUMMARY MEDICAL DECISION MAKING FREE TEXT BOX
21-year-old female presenting with fever x 4 days with epistaxis, recent travel to [facility] in contact with somebody with dengue fever.  Patient overall well-appearing, afebrile in the ED but does have borderline low blood pressures.  Plan for sepsis workup, blood work, UA, chest x-ray, symptomatic treatment, anticipate discharge.

## 2024-10-06 NOTE — ED STATDOCS - OBJECTIVE STATEMENT
29 year old female with no pertinent PMHx presenting to ED with sister translating at bedside c/o fever, headache body aches, and epistaxis x4days. Pt recently returned from [facility] and endorses sick contacts with family member there with dengue fever. Pt denies current fever, runny nose, congestion, sore throat, diarrhea, abdominal pain, taking meds regularly. Pt has no known medical allergies.

## 2024-10-06 NOTE — ED STATDOCS - CARE PROVIDER_API CALL
Isrrael Martins  [facility]  [address] [place] [address]  Phone: [phone]  Fax: [phone]  Follow Up Time:

## 2024-10-06 NOTE — ED STATDOCS - PROGRESS NOTE DETAILS
Pt. is a 29 year old female presenting with fever.  Pt. sister at bedside translating.  Pt. with headache, body aches, epistaxis x 4 days.  Pt. endorses recent trip to [facility] with sick family members there with Dengue fever.  Neg. runny nose, congestion, sore throat, abdominal pain, diarrhea. No current fever.    Poonam Armstrong PA-C Dirty urine sample x 2.  Will treat.  Lactate negative.  Neg. leukocytosis.  Return for any concerns.  Poonam Armstrong PA-C

## 2024-10-06 NOTE — ED ADULT NURSE NOTE - NSFALLUNIVINTERV_ED_ALL_ED
Bed/Stretcher in lowest position, wheels locked, appropriate side rails in place/Call bell, personal items and telephone in reach/Instruct patient to call for assistance before getting out of bed/chair/stretcher/Non-slip footwear applied when patient is off stretcher/Brick to call system/Physically safe environment - no spills, clutter or unnecessary equipment/Purposeful proactive rounding/Room/bathroom lighting operational, light cord in reach

## 2024-10-08 ENCOUNTER — APPOINTMENT (OUTPATIENT)
Dept: CARDIOLOGY | Facility: CLINIC | Age: 29
End: 2024-10-08

## 2024-10-08 VITALS
OXYGEN SATURATION: 99 % | HEIGHT: 60 IN | SYSTOLIC BLOOD PRESSURE: 110 MMHG | HEART RATE: 99 BPM | WEIGHT: 168 LBS | DIASTOLIC BLOOD PRESSURE: 64 MMHG | BODY MASS INDEX: 32.98 KG/M2

## 2024-10-08 LAB
CULTURE RESULTS: SIGNIFICANT CHANGE UP
CULTURE RESULTS: SIGNIFICANT CHANGE UP
SPECIMEN SOURCE: SIGNIFICANT CHANGE UP
SPECIMEN SOURCE: SIGNIFICANT CHANGE UP

## 2024-10-08 PROCEDURE — 93000 ELECTROCARDIOGRAM COMPLETE: CPT

## 2024-10-08 PROCEDURE — 99214 OFFICE O/P EST MOD 30 MIN: CPT | Mod: 25

## 2024-10-09 LAB
DENV IGG+IGM PNL SER IA: 1.12 ISR — SIGNIFICANT CHANGE UP
DENV IGM SER-ACNC: 1.26 ISR — SIGNIFICANT CHANGE UP

## 2024-10-11 LAB
CHIKUNGUNYA AB IGG REFLEX RESULT: NEGATIVE — SIGNIFICANT CHANGE UP
CHIKUNGUNYA AB IGM REFLEX RESULT: NEGATIVE — SIGNIFICANT CHANGE UP
CHIKV IGG TITR SERPL IF: NEGATIVE — SIGNIFICANT CHANGE UP
CHIKV IGM TITR SERPL IF: NEGATIVE — SIGNIFICANT CHANGE UP
CULTURE RESULTS: SIGNIFICANT CHANGE UP
CULTURE RESULTS: SIGNIFICANT CHANGE UP
SPECIMEN SOURCE: SIGNIFICANT CHANGE UP
SPECIMEN SOURCE: SIGNIFICANT CHANGE UP

## 2024-10-15 ENCOUNTER — APPOINTMENT (OUTPATIENT)
Dept: CARDIOLOGY | Facility: CLINIC | Age: 29
End: 2024-10-15
Payer: MEDICAID

## 2024-10-15 PROCEDURE — 93306 TTE W/DOPPLER COMPLETE: CPT

## 2024-10-15 PROCEDURE — 93015 CV STRESS TEST SUPVJ I&R: CPT

## 2024-10-29 ENCOUNTER — APPOINTMENT (OUTPATIENT)
Dept: CARDIOLOGY | Facility: CLINIC | Age: 29
End: 2024-10-29
Payer: MEDICAID

## 2024-10-29 VITALS
DIASTOLIC BLOOD PRESSURE: 60 MMHG | BODY MASS INDEX: 32.98 KG/M2 | OXYGEN SATURATION: 98 % | HEART RATE: 84 BPM | HEIGHT: 60 IN | WEIGHT: 168 LBS | SYSTOLIC BLOOD PRESSURE: 112 MMHG

## 2024-10-29 PROCEDURE — 93000 ELECTROCARDIOGRAM COMPLETE: CPT

## 2024-10-29 PROCEDURE — 99214 OFFICE O/P EST MOD 30 MIN: CPT | Mod: 25

## 2024-11-04 NOTE — ED ADULT TRIAGE NOTE - PATIENT/CAREGIVER ACCEPTED INTERPRETER SERVICES
The patient is Watcher - Medium risk of patient condition declining or worsening    Shift Goals  Clinical Goals: Manage/control blood pressure, Achieve desired Xa goal while on Heparin gtt  Patient Goals: Rest  Family Goals: MARLO    Progress made toward(s) clinical / shift goals:      Problem: Knowledge Deficit - Standard  Goal: Patient and family/care givers will demonstrate understanding of plan of care, disease process/condition, diagnostic tests and medications  Outcome: Progressing     Problem: Pain - Standard  Goal: Alleviation of pain or a reduction in pain to the patient’s comfort goal  Outcome: Progressing       Patient is not progressing towards the following goals:       yes